# Patient Record
Sex: FEMALE | Race: BLACK OR AFRICAN AMERICAN | NOT HISPANIC OR LATINO | URBAN - METROPOLITAN AREA
[De-identification: names, ages, dates, MRNs, and addresses within clinical notes are randomized per-mention and may not be internally consistent; named-entity substitution may affect disease eponyms.]

---

## 2023-06-02 ENCOUNTER — INPATIENT (INPATIENT)
Facility: HOSPITAL | Age: 73
LOS: 3 days | Discharge: HOME HEALTH | DRG: 246 | End: 2023-06-06
Attending: HOSPITALIST | Admitting: STUDENT IN AN ORGANIZED HEALTH CARE EDUCATION/TRAINING PROGRAM
Payer: MEDICAID

## 2023-06-02 VITALS
OXYGEN SATURATION: 94 % | TEMPERATURE: 98 F | HEIGHT: 63 IN | WEIGHT: 139.99 LBS | DIASTOLIC BLOOD PRESSURE: 64 MMHG | RESPIRATION RATE: 16 BRPM | SYSTOLIC BLOOD PRESSURE: 170 MMHG

## 2023-06-02 DIAGNOSIS — I50.22 CHRONIC SYSTOLIC (CONGESTIVE) HEART FAILURE: ICD-10-CM

## 2023-06-02 DIAGNOSIS — I50.9 HEART FAILURE, UNSPECIFIED: ICD-10-CM

## 2023-06-02 DIAGNOSIS — I25.10 ATHEROSCLEROTIC HEART DISEASE OF NATIVE CORONARY ARTERY WITHOUT ANGINA PECTORIS: ICD-10-CM

## 2023-06-02 DIAGNOSIS — J45.909 UNSPECIFIED ASTHMA, UNCOMPLICATED: ICD-10-CM

## 2023-06-02 DIAGNOSIS — Z29.9 ENCOUNTER FOR PROPHYLACTIC MEASURES, UNSPECIFIED: ICD-10-CM

## 2023-06-02 LAB
ALBUMIN SERPL ELPH-MCNC: 4.2 G/DL — SIGNIFICANT CHANGE UP (ref 3.3–5)
ALP SERPL-CCNC: 91 U/L — SIGNIFICANT CHANGE UP (ref 40–120)
ALT FLD-CCNC: 29 U/L — SIGNIFICANT CHANGE UP (ref 10–45)
ANION GAP SERPL CALC-SCNC: 12 MMOL/L — SIGNIFICANT CHANGE UP (ref 5–17)
AST SERPL-CCNC: 23 U/L — SIGNIFICANT CHANGE UP (ref 10–40)
BILIRUB SERPL-MCNC: 0.4 MG/DL — SIGNIFICANT CHANGE UP (ref 0.2–1.2)
BUN SERPL-MCNC: 25 MG/DL — HIGH (ref 7–23)
CALCIUM SERPL-MCNC: 10.1 MG/DL — SIGNIFICANT CHANGE UP (ref 8.4–10.5)
CHLORIDE SERPL-SCNC: 102 MMOL/L — SIGNIFICANT CHANGE UP (ref 96–108)
CO2 SERPL-SCNC: 25 MMOL/L — SIGNIFICANT CHANGE UP (ref 22–31)
CREAT SERPL-MCNC: 0.8 MG/DL — SIGNIFICANT CHANGE UP (ref 0.5–1.3)
EGFR: 78 ML/MIN/1.73M2 — SIGNIFICANT CHANGE UP
GLUCOSE SERPL-MCNC: 159 MG/DL — HIGH (ref 70–99)
HCT VFR BLD CALC: 44.4 % — SIGNIFICANT CHANGE UP (ref 34.5–45)
HGB BLD-MCNC: 14.3 G/DL — SIGNIFICANT CHANGE UP (ref 11.5–15.5)
MCHC RBC-ENTMCNC: 29 PG — SIGNIFICANT CHANGE UP (ref 27–34)
MCHC RBC-ENTMCNC: 32.2 GM/DL — SIGNIFICANT CHANGE UP (ref 32–36)
MCV RBC AUTO: 90.1 FL — SIGNIFICANT CHANGE UP (ref 80–100)
NRBC # BLD: 0 /100 WBCS — SIGNIFICANT CHANGE UP (ref 0–0)
PLATELET # BLD AUTO: 235 K/UL — SIGNIFICANT CHANGE UP (ref 150–400)
POTASSIUM SERPL-MCNC: 4.2 MMOL/L — SIGNIFICANT CHANGE UP (ref 3.5–5.3)
POTASSIUM SERPL-SCNC: 4.2 MMOL/L — SIGNIFICANT CHANGE UP (ref 3.5–5.3)
PROT SERPL-MCNC: 7.8 G/DL — SIGNIFICANT CHANGE UP (ref 6–8.3)
RBC # BLD: 4.93 M/UL — SIGNIFICANT CHANGE UP (ref 3.8–5.2)
RBC # FLD: 16.1 % — HIGH (ref 10.3–14.5)
SODIUM SERPL-SCNC: 139 MMOL/L — SIGNIFICANT CHANGE UP (ref 135–145)
WBC # BLD: 4.87 K/UL — SIGNIFICANT CHANGE UP (ref 3.8–10.5)
WBC # FLD AUTO: 4.87 K/UL — SIGNIFICANT CHANGE UP (ref 3.8–10.5)

## 2023-06-02 PROCEDURE — 93458 L HRT ARTERY/VENTRICLE ANGIO: CPT | Mod: 26

## 2023-06-02 PROCEDURE — 99233 SBSQ HOSP IP/OBS HIGH 50: CPT

## 2023-06-02 PROCEDURE — 93010 ELECTROCARDIOGRAM REPORT: CPT

## 2023-06-02 PROCEDURE — 93306 TTE W/DOPPLER COMPLETE: CPT | Mod: 26

## 2023-06-02 PROCEDURE — 99152 MOD SED SAME PHYS/QHP 5/>YRS: CPT

## 2023-06-02 RX ORDER — HYDRALAZINE HCL 50 MG
5 TABLET ORAL
Refills: 0 | DISCHARGE

## 2023-06-02 RX ORDER — MOMETASONE FUROATE 220 UG/1
1 INHALANT RESPIRATORY (INHALATION) DAILY
Refills: 0 | Status: DISCONTINUED | OUTPATIENT
Start: 2023-06-02 | End: 2023-06-06

## 2023-06-02 RX ORDER — CARVEDILOL PHOSPHATE 80 MG/1
6.25 CAPSULE, EXTENDED RELEASE ORAL EVERY 12 HOURS
Refills: 0 | Status: DISCONTINUED | OUTPATIENT
Start: 2023-06-02 | End: 2023-06-06

## 2023-06-02 RX ORDER — ALBUTEROL 90 UG/1
2 AEROSOL, METERED ORAL EVERY 6 HOURS
Refills: 0 | Status: DISCONTINUED | OUTPATIENT
Start: 2023-06-02 | End: 2023-06-06

## 2023-06-02 RX ORDER — ATORVASTATIN CALCIUM 80 MG/1
40 TABLET, FILM COATED ORAL AT BEDTIME
Refills: 0 | Status: DISCONTINUED | OUTPATIENT
Start: 2023-06-02 | End: 2023-06-06

## 2023-06-02 RX ORDER — NIFEDIPINE 30 MG
1 TABLET, EXTENDED RELEASE 24 HR ORAL
Refills: 0 | DISCHARGE

## 2023-06-02 RX ORDER — ASPIRIN/CALCIUM CARB/MAGNESIUM 324 MG
81 TABLET ORAL DAILY
Refills: 0 | Status: DISCONTINUED | OUTPATIENT
Start: 2023-06-02 | End: 2023-06-06

## 2023-06-02 RX ORDER — MOMETASONE FUROATE 220 UG/1
1 INHALANT RESPIRATORY (INHALATION) DAILY
Refills: 0 | Status: DISCONTINUED | OUTPATIENT
Start: 2023-06-02 | End: 2023-06-02

## 2023-06-02 RX ORDER — SACUBITRIL AND VALSARTAN 24; 26 MG/1; MG/1
1 TABLET, FILM COATED ORAL
Refills: 0 | Status: DISCONTINUED | OUTPATIENT
Start: 2023-06-02 | End: 2023-06-03

## 2023-06-02 RX ADMIN — SACUBITRIL AND VALSARTAN 1 TABLET(S): 24; 26 TABLET, FILM COATED ORAL at 17:31

## 2023-06-02 RX ADMIN — ATORVASTATIN CALCIUM 40 MILLIGRAM(S): 80 TABLET, FILM COATED ORAL at 22:28

## 2023-06-02 RX ADMIN — CARVEDILOL PHOSPHATE 6.25 MILLIGRAM(S): 80 CAPSULE, EXTENDED RELEASE ORAL at 17:31

## 2023-06-02 NOTE — PROGRESS NOTE ADULT - PROBLEM SELECTOR PLAN 1
Transferred from [facility] with shortness of breath with effusions, found to have acute CHFrEF (report not in chart)   Underwent LHC 6/2 with 90% prox diagonal and 80% OM   - plans for PCI on Monday   - c/w coreg  - c/w aspirin and atorvastatin   - c/w tele

## 2023-06-02 NOTE — H&P ADULT - NSHPLABSRESULTS_GEN_ALL_CORE
Personally reviewed labs, imaging, ekg                           14.3   4.87  )-----------( 235      ( 02 Jun 2023 15:56 )             44.4             Lactate Trend      CAPILLARY BLOOD GLUCOSE  Personal interpretation EKG:

## 2023-06-02 NOTE — PROGRESS NOTE ADULT - ASSESSMENT
2 year old female h/o HTN (only med at home Dilt and Albuterol), asthma (no intubations) presents with new CHFrEF i/s/o positive stress test. [facility] with obstructive CAD, plans for PCI.  72 year old female h/o HTN (only med at home Dilt and Albuterol), asthma (no intubations) presents with new CHFrEF i/s/o positive stress test. [facility] with obstructive CAD, plans for PCI.  72 year old female h/o HTN (only med at home nifepidine and Albuterol), asthma (no intubations) presents with new CHFrEF i/s/o positive stress test. [facility] with obstructive CAD, plans for PCI.

## 2023-06-02 NOTE — H&P CARDIOLOGY - NSICDXFAMHXNEG_GEN_ALL
Clothing 10m3w Female with recent travel to [place], with viral syndrome x 2days 10m3w Female with recent travel to [place], with viral syndrome x 2days  Coxsackie virus infection pt unaware of her family hx

## 2023-06-02 NOTE — PATIENT PROFILE ADULT - FALL HARM RISK - UNIVERSAL INTERVENTIONS
Bed in lowest position, wheels locked, appropriate side rails in place/Call bell, personal items and telephone in reach/Instruct patient to call for assistance before getting out of bed or chair/Non-slip footwear when patient is out of bed/[place] to call system/Physically safe environment - no spills, clutter or unnecessary equipment/Purposeful Proactive Rounding/Room/bathroom lighting operational, light cord in reach

## 2023-06-02 NOTE — PROGRESS NOTE ADULT - SUBJECTIVE AND OBJECTIVE BOX
PATIENT: CHRISTIANE OREILLY, MRN: [id number]    CHIEF COMPLAINT: Patient is a 72y old  Female who presents with a chief complaint of     INTERVAL HISTORY/OVERNIGHT EVENTS: Patient transferred to [facility] for [facility] i/s/o acute CHFrEF.     MEDICATIONS:  MEDICATIONS  (STANDING):  aspirin enteric coated 81 milliGRAM(s) Oral daily  atorvastatin 40 milliGRAM(s) Oral at bedtime  carvedilol 6.25 milliGRAM(s) Oral every 12 hours  mometasone 220 MICROgram(s) Inhaler 1 Puff(s) Inhalation daily  sacubitril 49 mG/valsartan 51 mG 1 Tablet(s) Oral two times a day    MEDICATIONS  (PRN):  albuterol    90 MICROgram(s) HFA Inhaler 2 Puff(s) Inhalation every 6 hours PRN for shortness of breath and/or wheezing      ALLERGIES: Allergies    No Known Allergies    Intolerances        OBJECTIVE:  ICU Vital Signs Last 24 Hrs  T(C): 36.4 (2023 14:05), Max: 36.4 (2023 11:27)  T(F): 97.5 (2023 14:05), Max: 97.5 (2023 11:27)  HR: 68 (2023 16:05) (55 - 84)  BP: 140/72 (2023 16:05) (140/72 - 170/64)  BP(mean): 89 (2023 16:05) (85 - 99)  ABP: --  ABP(mean): --  RR: 17 (2023 16:05) (16 - 18)  SpO2: 94% (2023 16:05) (92% - 94%)    O2 Parameters below as of 2023 16:05  Patient On (Oxygen Delivery Method): room air            Adult Advanced Hemodynamics Last 24 Hrs  CVP(mm Hg): --  CVP(cm H2O): --  CO: --  CI: --  PA: --  PA(mean): --  PCWP: --  SVR: --  SVRI: --  PVR: --  PVRI: --  CAPILLARY BLOOD GLUCOSE        CAPILLARY BLOOD GLUCOSE        I&O's Summary    Daily Height in cm: 160.02 (2023 11:35)    Daily Weight in k.5 (2023 11:27)    PHYSICAL EXAMINATION:  General: Comfortable, no acute distress, cooperative with exam.  HEENT: PERRLA, EOMI, moist mucous membranes.  Respiratory: CTAB, normal respiratory effort, no coughing, wheezes, crackles, or rales.  CV: RRR, S1S2, no murmurs, rubs or gallops. No JVD. Distal pulses intact.  Abdominal: Soft, nontender, nondistended, no rebound or guarding, normal bowel sounds.  Neurology: AOx3, no focal neuro defects, SOTO x 4.  Extremities: No pitting edema, + Peripheral pulses.  Incisions:   Tubes:    LABS:                          14.3   4.87  )-----------( 235      ( 2023 15:56 )             44.4     06-02    139  |  102  |  25<H>  ----------------------------<  159<H>  4.2   |  25  |  0.80    Ca    10.1      2023 15:56    TPro  7.8  /  Alb  4.2  /  TBili  0.4  /  DBili  x   /  AST  23  /  ALT  29  /  AlkPhos  91  06-02    LIVER FUNCTIONS - ( 2023 15:56 )  Alb: 4.2 g/dL / Pro: 7.8 g/dL / ALK PHOS: 91 U/L / ALT: 29 U/L / AST: 23 U/L / GGT: x          PATIENT: CHRISTIANE OREILLY, MRN: [id number]    CHIEF COMPLAINT: Patient is a 72y old  Female who presents with a chief complaint of     INTERVAL HISTORY/OVERNIGHT EVENTS:  Patient transferred to [facility] for LHC i/s/o acute CHFrEF. Underwent LHC with obstructive CAD in prox diagonal and OM. Patient denies acute complaints at this time, including chest pain, shortness of breath, cough, fevers/chills. Denies weakness to the upper or lower extremities.     MEDICATIONS:  MEDICATIONS  (STANDING):  aspirin enteric coated 81 milliGRAM(s) Oral daily  atorvastatin 40 milliGRAM(s) Oral at bedtime  carvedilol 6.25 milliGRAM(s) Oral every 12 hours  mometasone 220 MICROgram(s) Inhaler 1 Puff(s) Inhalation daily  sacubitril 49 mG/valsartan 51 mG 1 Tablet(s) Oral two times a day    MEDICATIONS  (PRN):  albuterol    90 MICROgram(s) HFA Inhaler 2 Puff(s) Inhalation every 6 hours PRN for shortness of breath and/or wheezing      ALLERGIES: Allergies    No Known Allergies    Intolerances        OBJECTIVE:  ICU Vital Signs Last 24 Hrs  T(C): 36.4 (2023 14:05), Max: 36.4 (2023 11:27)  T(F): 97.5 (2023 14:05), Max: 97.5 (2023 11:27)  HR: 68 (2023 16:05) (55 - 84)  BP: 140/72 (2023 16:05) (140/72 - 170/64)  BP(mean): 89 (2023 16:05) (85 - 99)  ABP: --  ABP(mean): --  RR: 17 (2023 16:05) (16 - 18)  SpO2: 94% (2023 16:05) (92% - 94%)    O2 Parameters below as of 2023 16:05  Patient On (Oxygen Delivery Method): room air            Adult Advanced Hemodynamics Last 24 Hrs  CVP(mm Hg): --  CVP(cm H2O): --  CO: --  CI: --  PA: --  PA(mean): --  PCWP: --  SVR: --  SVRI: --  PVR: --  PVRI: --  CAPILLARY BLOOD GLUCOSE        CAPILLARY BLOOD GLUCOSE        I&O's Summary    Daily Height in cm: 160.02 (2023 11:35)    Daily Weight in k.5 (2023 11:27)    PHYSICAL EXAMINATION:  General: Comfortable, no acute distress, cooperative with exam.  HEENT: PERRLA, EOMI, moist mucous membranes.  Respiratory: CTAB, normal respiratory effort, no coughing, wheezes, crackles, or rales.  CV: RRR, S1S2, no murmurs, rubs or gallops. No JVD. Distal pulses intact.  Abdominal: Soft, nontender, nondistended, no rebound or guarding, normal bowel sounds.  Neurology: AOx3, no focal neuro defects, SOTO x 4.  Extremities: No pitting edema, + Peripheral pulses (radial and DP)     LABS:                          14.3   4.87  )-----------( 235      ( 2023 15:56 )             44.4     06-02    139  |  102  |  25<H>  ----------------------------<  159<H>  4.2   |  25  |  0.80    Ca    10.1      2023 15:56    TPro  7.8  /  Alb  4.2  /  TBili  0.4  /  DBili  x   /  AST  23  /  ALT  29  /  AlkPhos  91  06-02    LIVER FUNCTIONS - ( 2023 15:56 )  Alb: 4.2 g/dL / Pro: 7.8 g/dL / ALK PHOS: 91 U/L / ALT: 29 U/L / AST: 23 U/L / GGT: x

## 2023-06-02 NOTE — H&P CARDIOLOGY - NSICDXPASTMEDICALHX_GEN_ALL_CORE_FT
PAST MEDICAL HISTORY:  HFrEF (heart failure with reduced ejection fraction)      PAST MEDICAL HISTORY:  HFrEF (heart failure with reduced ejection fraction)     HTN (hypertension)     Mild asthma

## 2023-06-02 NOTE — H&P ADULT - NSICDXPASTMEDICALHX_GEN_ALL_CORE_FT
PAST MEDICAL HISTORY:  HFrEF (heart failure with reduced ejection fraction)     HTN (hypertension)     Mild asthma

## 2023-06-02 NOTE — PROGRESS NOTE ADULT - PROBLEM SELECTOR PLAN 2
Does not appear grossly volume overloaded at this time   - repeat TTE  - c/w entresto and coreg, well tolerated  - consider lasix if overloaded

## 2023-06-02 NOTE — H&P CARDIOLOGY - HISTORY OF PRESENT ILLNESS
72 year old female h/o    pt presented to [facility] on 5/30/23 with new onset HFrEF and had a positive stress test. Pt transferred to [facility] today for LHC.  72 year old female h/o    pt presented to [facility] on 5/30/23 with new onset HFrEF and had a positive stress test. Pt transferred to [facility] today for LHC.     wbc 4.18, H/H 12.2/38.2 Plts 225  Na 139 K 4.0 BUN 26  Cr 0.5 eGRF >60 72 year old female h/o    pt presented to [facility] on 5/30/23 with new onset HFrEF and had a positive stress test. Pt transferred to [facility] today for LHC. Will defer pre hydration IV fluids in the setting of and IV diuresis.      wbc 4.18, H/H 12.2/38.2 Plts 225  Na 139 K 4.0 BUN 26  Cr 0.5 eGRF >60 72 year old female h/o HTN, asthma (no intubations) who noted LE edema for a few weeks and then awoke at from sleep SOB and coughing early Tues AM 5/30 and   presented to [facility] on 5/30/23 with new onset HFrEF and had a positive stress test. Pt transferred to [facility] today for LHC. Will defer pre hydration IV fluids in the setting of and IV diuresis.      wbc 4.18, H/H 12.2/38.2 Plts 225  Na 139 K 4.0 BUN 26  Cr 0.5 eGRF >60 72 year old female h/o HTN (only med at home Dilt and Albuterol), asthma (no intubations) who noted LE edema for a few weeks and then awoke at from sleep SOB and coughing early Tues AM 5/30 and   presented to [facility] on 5/30/23 with new onset HFrEF and had a positive stress test. Pt transferred to [facility] today for LHC. Will defer pre hydration IV fluids in the setting of and IV diuresis.      wbc 4.18, H/H 12.2/38.2 Plts 225  Na 139 K 4.0 BUN 26  Cr 0.5 eGRF >60

## 2023-06-02 NOTE — H&P ADULT - ASSESSMENT
72 year old female h/o HTN (only med at home Dilt and Albuterol), asthma (no intubations) presents with new CHFrEF i/s/o positive stress test.

## 2023-06-03 LAB
ANION GAP SERPL CALC-SCNC: 12 MMOL/L — SIGNIFICANT CHANGE UP (ref 5–17)
BLD GP AB SCN SERPL QL: NEGATIVE — SIGNIFICANT CHANGE UP
BUN SERPL-MCNC: 35 MG/DL — HIGH (ref 7–23)
CALCIUM SERPL-MCNC: 9.9 MG/DL — SIGNIFICANT CHANGE UP (ref 8.4–10.5)
CHLORIDE SERPL-SCNC: 103 MMOL/L — SIGNIFICANT CHANGE UP (ref 96–108)
CO2 SERPL-SCNC: 20 MMOL/L — LOW (ref 22–31)
CREAT SERPL-MCNC: 0.65 MG/DL — SIGNIFICANT CHANGE UP (ref 0.5–1.3)
EGFR: 93 ML/MIN/1.73M2 — SIGNIFICANT CHANGE UP
GLUCOSE SERPL-MCNC: 124 MG/DL — HIGH (ref 70–99)
HCT VFR BLD CALC: 41.8 % — SIGNIFICANT CHANGE UP (ref 34.5–45)
HCV AB S/CO SERPL IA: 0.11 S/CO — SIGNIFICANT CHANGE UP (ref 0–0.99)
HCV AB SERPL-IMP: SIGNIFICANT CHANGE UP
HGB BLD-MCNC: 13.4 G/DL — SIGNIFICANT CHANGE UP (ref 11.5–15.5)
MAGNESIUM SERPL-MCNC: 2.1 MG/DL — SIGNIFICANT CHANGE UP (ref 1.6–2.6)
MCHC RBC-ENTMCNC: 29 PG — SIGNIFICANT CHANGE UP (ref 27–34)
MCHC RBC-ENTMCNC: 32.1 GM/DL — SIGNIFICANT CHANGE UP (ref 32–36)
MCV RBC AUTO: 90.5 FL — SIGNIFICANT CHANGE UP (ref 80–100)
NRBC # BLD: 0 /100 WBCS — SIGNIFICANT CHANGE UP (ref 0–0)
PHOSPHATE SERPL-MCNC: 3 MG/DL — SIGNIFICANT CHANGE UP (ref 2.5–4.5)
PLATELET # BLD AUTO: 208 K/UL — SIGNIFICANT CHANGE UP (ref 150–400)
POTASSIUM SERPL-MCNC: 4 MMOL/L — SIGNIFICANT CHANGE UP (ref 3.5–5.3)
POTASSIUM SERPL-SCNC: 4 MMOL/L — SIGNIFICANT CHANGE UP (ref 3.5–5.3)
RBC # BLD: 4.62 M/UL — SIGNIFICANT CHANGE UP (ref 3.8–5.2)
RBC # FLD: 16.3 % — HIGH (ref 10.3–14.5)
RH IG SCN BLD-IMP: POSITIVE — SIGNIFICANT CHANGE UP
SODIUM SERPL-SCNC: 135 MMOL/L — SIGNIFICANT CHANGE UP (ref 135–145)
TROPONIN T, HIGH SENSITIVITY RESULT: 104 NG/L — HIGH (ref 0–51)
WBC # BLD: 4.38 K/UL — SIGNIFICANT CHANGE UP (ref 3.8–10.5)
WBC # FLD AUTO: 4.38 K/UL — SIGNIFICANT CHANGE UP (ref 3.8–10.5)

## 2023-06-03 PROCEDURE — 99232 SBSQ HOSP IP/OBS MODERATE 35: CPT | Mod: GC

## 2023-06-03 PROCEDURE — 93010 ELECTROCARDIOGRAM REPORT: CPT

## 2023-06-03 PROCEDURE — 99233 SBSQ HOSP IP/OBS HIGH 50: CPT

## 2023-06-03 RX ORDER — SPIRONOLACTONE 25 MG/1
25 TABLET, FILM COATED ORAL DAILY
Refills: 0 | Status: DISCONTINUED | OUTPATIENT
Start: 2023-06-03 | End: 2023-06-05

## 2023-06-03 RX ORDER — ENOXAPARIN SODIUM 100 MG/ML
40 INJECTION SUBCUTANEOUS EVERY 24 HOURS
Refills: 0 | Status: DISCONTINUED | OUTPATIENT
Start: 2023-06-03 | End: 2023-06-06

## 2023-06-03 RX ORDER — SACUBITRIL AND VALSARTAN 24; 26 MG/1; MG/1
1 TABLET, FILM COATED ORAL
Refills: 0 | Status: DISCONTINUED | OUTPATIENT
Start: 2023-06-03 | End: 2023-06-03

## 2023-06-03 RX ORDER — SACUBITRIL AND VALSARTAN 24; 26 MG/1; MG/1
1 TABLET, FILM COATED ORAL
Refills: 0 | Status: DISCONTINUED | OUTPATIENT
Start: 2023-06-03 | End: 2023-06-06

## 2023-06-03 RX ADMIN — ATORVASTATIN CALCIUM 40 MILLIGRAM(S): 80 TABLET, FILM COATED ORAL at 22:09

## 2023-06-03 RX ADMIN — ENOXAPARIN SODIUM 40 MILLIGRAM(S): 100 INJECTION SUBCUTANEOUS at 14:46

## 2023-06-03 RX ADMIN — SACUBITRIL AND VALSARTAN 1 TABLET(S): 24; 26 TABLET, FILM COATED ORAL at 22:09

## 2023-06-03 RX ADMIN — SACUBITRIL AND VALSARTAN 1 TABLET(S): 24; 26 TABLET, FILM COATED ORAL at 06:44

## 2023-06-03 RX ADMIN — CARVEDILOL PHOSPHATE 6.25 MILLIGRAM(S): 80 CAPSULE, EXTENDED RELEASE ORAL at 18:04

## 2023-06-03 RX ADMIN — Medication 81 MILLIGRAM(S): at 14:47

## 2023-06-03 RX ADMIN — MOMETASONE FUROATE 1 PUFF(S): 220 INHALANT RESPIRATORY (INHALATION) at 18:10

## 2023-06-03 RX ADMIN — SPIRONOLACTONE 25 MILLIGRAM(S): 25 TABLET, FILM COATED ORAL at 18:10

## 2023-06-03 RX ADMIN — CARVEDILOL PHOSPHATE 6.25 MILLIGRAM(S): 80 CAPSULE, EXTENDED RELEASE ORAL at 06:44

## 2023-06-03 NOTE — PROGRESS NOTE ADULT - ASSESSMENT
72 year old female h/o HTN (only med at home nifepidine and Albuterol), asthma (no intubations) presents with new CHFrEF i/s/o positive stress test. [facility] with obstructive CAD, plans for PCI.

## 2023-06-03 NOTE — CHART NOTE - NSCHARTNOTEFT_GEN_A_CORE
Called to bedside to evaluate patient for chest pain. Patient states that about 20 minutes prior she started having head and jaw pain that radiated down to her chest (midsternum). Patient reports that her chest was not tender to palpation, and the pain was not worse with breathing or movement. She was sitting in bed and did not exert herself at the onset of the pain. Patient's family at bedside gave her a few cups of water and she reports that the chest pain is now resolved. Patient breathing comfortably, lungs clear to ascultation. Heart normal S1, S2. EKG obtained and reviewed by myself and cardiology fellow Miah Costello. No major changes identified. Will obtain repeat troponin for further evaluation and monitor patient for additional episodes of chest pain. No changes to plan according to cardiology.     Tammy Bruner MD   PGY2 Internal Medicine

## 2023-06-03 NOTE — PROGRESS NOTE ADULT - SUBJECTIVE AND OBJECTIVE BOX
Patient seen and examined at bedside.    Overnight Events: Denice CHERRY on 6/2 pm for 10 seconds up to 140s    Review Of Systems: No chest pain, shortness of breath, or palpitations            Current Meds:  albuterol    90 MICROgram(s) HFA Inhaler 2 Puff(s) Inhalation every 6 hours PRN  aspirin enteric coated 81 milliGRAM(s) Oral daily  atorvastatin 40 milliGRAM(s) Oral at bedtime  carvedilol 6.25 milliGRAM(s) Oral every 12 hours  enoxaparin Injectable 40 milliGRAM(s) SubCutaneous every 24 hours  mometasone 220 MICROgram(s) Inhaler 1 Puff(s) Inhalation daily  sacubitril 49 mG/valsartan 51 mG 1 Tablet(s) Oral two times a day      Vitals:  T(F): 97.6 (06-03), Max: 97.7 (06-02)  HR: 57 (06-03) (55 - 84)  BP: 159/82 (06-03) (139/75 - 170/64)  RR: 18 (06-03)  SpO2: 94% (06-03)  I&O's Summary    02 Jun 2023 07:01  -  03 Jun 2023 07:00  --------------------------------------------------------  IN: 440 mL / OUT: 0 mL / NET: 440 mL        Physical Exam:  Appearance: No acute distress; well appearing  Cardiovascular: RRR, S1, S2, no murmurs, rubs, or gallops; no edema; no JVD  Respiratory: Clear to auscultation bilaterally  Gastrointestinal: soft, non-tender, non-distended with normal bowel sounds  Neurologic: Non-focal                          13.4   4.38  )-----------( 208      ( 03 Jun 2023 07:12 )             41.8     06-03    135  |  103  |  35<H>  ----------------------------<  124<H>  4.0   |  20<L>  |  0.65    Ca    9.9      03 Jun 2023 07:07  Phos  3.0     06-03  Mg     2.1     06-03    TPro  7.8  /  Alb  4.2  /  TBili  0.4  /  DBili  x   /  AST  23  /  ALT  29  /  AlkPhos  91  06-02

## 2023-06-03 NOTE — PROGRESS NOTE ADULT - PROBLEM SELECTOR PLAN 1
Transferred from [facility] with shortness of breath with effusions, found to have acute CHFrEF (report not in chart)   Underwent LHC 6/2 with 90% prox diagonal and 80% OM   - plans for PCI on Monday   - c/w coreg  - c/w aspirin and atorvastatin   - c/w tele

## 2023-06-03 NOTE — PROGRESS NOTE ADULT - PROBLEM SELECTOR PLAN 2
Does not appear grossly volume overloaded at this time   TTE 6/2 with EF 38% with global hypokinesis   - c/w entresto and coreg, well tolerated  - consider lasix if overloaded Does not appear grossly volume overloaded at this time   TTE 6/2 with EF 38% with global hypokinesis (multivessel disease)   Cardiology recs appreciated  - hypertensive, will increase entresto dose   - c/w coreg, well tolerated  - started spironolactone 25mg   - consider lasix if overloaded

## 2023-06-03 NOTE — PROGRESS NOTE ADULT - ASSESSMENT
71 yo F with hx of HTN, asthma presented to [facility] with new onset ADHF and found to have +stress test, transferred to [facility] for LHC, which showed 70% D1, 80% dCx, 90% OM1, pRCA 60%, plan for PCI Monday. Currently feels well, denies CP/SOB.     #ADHF  - appears euvolemic today  - TTE showed LVEF 38% with poor wall visualization, likely global  - c/w coreg 6.25 BID  - c/w Entresto 49/51 BID, can increase to 97/103 BID if BP persistently elevated  - can start spironolactone 25 daily  - eventual SGLT2i  - can hold diuretics for now    #CAD  - c/w asa and statin as written  73 yo F with hx of HTN, asthma presented to [facility] with new onset ADHF and found to have +stress test, transferred to [facility] for LHC, which showed 70% D1, 80% dCx, 90% OM1, pRCA 60%, plan for PCI Monday. Currently feels well, denies CP/SOB.     #ADHF  - appears euvolemic today  - TTE showed LVEF 38% with poor wall visualization, likely global  - c/w coreg 6.25 BID  - c/w Entresto 49/51 BID, can increase to 97/103 BID if BP persistently elevated  - can start spironolactone 25 daily  - eventual SGLT2i  - can hold diuretics for now    #CAD  - c/w asa and statin as written     This patient was seen and examined personally by me and the plan was discussed with the fellow and/or resident above. Amendments were made as necessary to the above. Agree with the excellent note and plan above. Revasc plan for monday, likely PCI.    Pedro Ramírez MD, MPhil, [facility]  Cardiologist, [facility]  ; [facility] and hospitals/[facility]  Email: [email or web address] Cardiology and Cardiovascular Surgery on-service contact/call information, go to [email or web address] and use "[email or web address] Identity [email or web address]" to login.  Outpatient Cardiology appointments, call [phone] to arrange with a colleague; I do not have outpatient Cardiology clinic.

## 2023-06-03 NOTE — PROGRESS NOTE ADULT - SUBJECTIVE AND OBJECTIVE BOX
PATIENT: CHRISTIANE OREILLY, MRN: [id number]    CHIEF COMPLAINT: Patient is a 72y old  Female who presents with a chief complaint of     INTERVAL HISTORY/OVERNIGHT EVENTS: No overnight events. Patient denies chest pain, SOB, lightheadedness this AM     MEDICATIONS:  MEDICATIONS  (STANDING):  aspirin enteric coated 81 milliGRAM(s) Oral daily  atorvastatin 40 milliGRAM(s) Oral at bedtime  carvedilol 6.25 milliGRAM(s) Oral every 12 hours  mometasone 220 MICROgram(s) Inhaler 1 Puff(s) Inhalation daily  sacubitril 49 mG/valsartan 51 mG 1 Tablet(s) Oral two times a day    MEDICATIONS  (PRN):  albuterol    90 MICROgram(s) HFA Inhaler 2 Puff(s) Inhalation every 6 hours PRN for shortness of breath and/or wheezing      ALLERGIES: Allergies    No Known Allergies    Intolerances        OBJECTIVE:  ICU Vital Signs Last 24 Hrs  T(C): 36.4 (2023 04:00), Max: 36.5 (2023 20:46)  T(F): 97.6 (2023 04:00), Max: 97.7 (2023 20:46)  HR: 57 (2023 04:00) (55 - 84)  BP: 159/82 (2023 04:00) (139/75 - 170/64)  BP(mean): 91 (2023 17:35) (85 - 101)  ABP: --  ABP(mean): --  RR: 18 (2023 04:00) (16 - 18)  SpO2: 94% (2023 04:00) (92% - 96%)    O2 Parameters below as of 2023 04:00  Patient On (Oxygen Delivery Method): room air            Adult Advanced Hemodynamics Last 24 Hrs  CVP(mm Hg): --  CVP(cm H2O): --  CO: --  CI: --  PA: --  PA(mean): --  PCWP: --  SVR: --  SVRI: --  PVR: --  PVRI: --  CAPILLARY BLOOD GLUCOSE        CAPILLARY BLOOD GLUCOSE        I&O's Summary    2023 07:01  -  2023 07:00  --------------------------------------------------------  IN: 440 mL / OUT: 0 mL / NET: 440 mL      Daily Height in cm: 160.02 (2023 11:35)    Daily Weight in k.9 (2023 04:00)    PHYSICAL EXAMINATION:  General: Comfortable, no acute distress, cooperative with exam.  HEENT: PERRLA, EOMI, moist mucous membranes.  Respiratory: CTAB, normal respiratory effort, no coughing, wheezes, crackles, or rales.  CV: RRR, S1S2, no murmurs, rubs or gallops. No JVD. Distal pulses intact.  Abdominal: Soft, nontender, nondistended, no rebound or guarding, normal bowel sounds.  Neurology: AOx3, no focal neuro defects, SOTO x 4.  Extremities: No pitting edema, + Peripheral pulses (radial and DP)       LABS:                          14.3   4.87  )-----------( 235      ( 2023 15:56 )             44.4     06-02    139  |  102  |  25<H>  ----------------------------<  159<H>  4.2   |  25  |  0.80    Ca    10.1      2023 15:56    TPro  7.8  /  Alb  4.2  /  TBili  0.4  /  DBili  x   /  AST  23  /  ALT  29  /  AlkPhos  91  06-02    LIVER FUNCTIONS - ( 2023 15:56 )  Alb: 4.2 g/dL / Pro: 7.8 g/dL / ALK PHOS: 91 U/L / ALT: 29 U/L / AST: 23 U/L / GGT: x                  PATIENT: CHRISTIANE OREILLY, MRN: [id number]    CHIEF COMPLAINT: Patient is a 72y old  Female who presents with a chief complaint of     INTERVAL HISTORY/OVERNIGHT EVENTS: Had PAT for 10sec overnight. Patient denies chest pain, SOB, lightheadedness this AM. No acute complaints.     MEDICATIONS:  MEDICATIONS  (STANDING):  aspirin enteric coated 81 milliGRAM(s) Oral daily  atorvastatin 40 milliGRAM(s) Oral at bedtime  carvedilol 6.25 milliGRAM(s) Oral every 12 hours  mometasone 220 MICROgram(s) Inhaler 1 Puff(s) Inhalation daily  sacubitril 49 mG/valsartan 51 mG 1 Tablet(s) Oral two times a day    MEDICATIONS  (PRN):  albuterol    90 MICROgram(s) HFA Inhaler 2 Puff(s) Inhalation every 6 hours PRN for shortness of breath and/or wheezing      ALLERGIES: Allergies    No Known Allergies    Intolerances        OBJECTIVE:  ICU Vital Signs Last 24 Hrs  T(C): 36.4 (2023 04:00), Max: 36.5 (2023 20:46)  T(F): 97.6 (2023 04:00), Max: 97.7 (2023 20:46)  HR: 57 (2023 04:00) (55 - 84)  BP: 159/82 (2023 04:00) (139/75 - 170/64)  BP(mean): 91 (2023 17:35) (85 - 101)  ABP: --  ABP(mean): --  RR: 18 (2023 04:00) (16 - 18)  SpO2: 94% (2023 04:00) (92% - 96%)    O2 Parameters below as of 2023 04:00  Patient On (Oxygen Delivery Method): room air            Adult Advanced Hemodynamics Last 24 Hrs  CVP(mm Hg): --  CVP(cm H2O): --  CO: --  CI: --  PA: --  PA(mean): --  PCWP: --  SVR: --  SVRI: --  PVR: --  PVRI: --  CAPILLARY BLOOD GLUCOSE        CAPILLARY BLOOD GLUCOSE        I&O's Summary    2023 07:01  -  2023 07:00  --------------------------------------------------------  IN: 440 mL / OUT: 0 mL / NET: 440 mL      Daily Height in cm: 160.02 (2023 11:35)    Daily Weight in k.9 (2023 04:00)    PHYSICAL EXAMINATION:  General: Comfortable, no acute distress, cooperative with exam.  HEENT: PERRLA, EOMI, moist mucous membranes.  Respiratory: CTAB, normal respiratory effort, no coughing, wheezes, crackles, or rales.  CV: RRR, S1S2, no murmurs, rubs or gallops. No JVD. Distal pulses intact.  Abdominal: Soft, nontender, nondistended, no rebound or guarding, normal bowel sounds.  Neurology: AOx3, no focal neuro defects, SOTO x 4.  Extremities: No pitting edema, + Peripheral pulses (radial and DP)       LABS:                          14.3   4.87  )-----------( 235      ( 2023 15:56 )             44.4     06-02    139  |  102  |  25<H>  ----------------------------<  159<H>  4.2   |  25  |  0.80    Ca    10.1      2023 15:56    TPro  7.8  /  Alb  4.2  /  TBili  0.4  /  DBili  x   /  AST  23  /  ALT  29  /  AlkPhos  91  06-02    LIVER FUNCTIONS - ( 2023 15:56 )  Alb: 4.2 g/dL / Pro: 7.8 g/dL / ALK PHOS: 91 U/L / ALT: 29 U/L / AST: 23 U/L / GGT: x

## 2023-06-04 LAB
ANION GAP SERPL CALC-SCNC: 11 MMOL/L — SIGNIFICANT CHANGE UP (ref 5–17)
APTT BLD: 30.5 SEC — SIGNIFICANT CHANGE UP (ref 27.5–35.5)
BUN SERPL-MCNC: 38 MG/DL — HIGH (ref 7–23)
CALCIUM SERPL-MCNC: 9.6 MG/DL — SIGNIFICANT CHANGE UP (ref 8.4–10.5)
CHLORIDE SERPL-SCNC: 104 MMOL/L — SIGNIFICANT CHANGE UP (ref 96–108)
CO2 SERPL-SCNC: 21 MMOL/L — LOW (ref 22–31)
CREAT SERPL-MCNC: 0.72 MG/DL — SIGNIFICANT CHANGE UP (ref 0.5–1.3)
EGFR: 89 ML/MIN/1.73M2 — SIGNIFICANT CHANGE UP
GLUCOSE SERPL-MCNC: 132 MG/DL — HIGH (ref 70–99)
HCT VFR BLD CALC: 39.6 % — SIGNIFICANT CHANGE UP (ref 34.5–45)
HGB BLD-MCNC: 12.7 G/DL — SIGNIFICANT CHANGE UP (ref 11.5–15.5)
INR BLD: 1.04 RATIO — SIGNIFICANT CHANGE UP (ref 0.88–1.16)
MAGNESIUM SERPL-MCNC: 2.2 MG/DL — SIGNIFICANT CHANGE UP (ref 1.6–2.6)
MCHC RBC-ENTMCNC: 29.5 PG — SIGNIFICANT CHANGE UP (ref 27–34)
MCHC RBC-ENTMCNC: 32.1 GM/DL — SIGNIFICANT CHANGE UP (ref 32–36)
MCV RBC AUTO: 91.9 FL — SIGNIFICANT CHANGE UP (ref 80–100)
NRBC # BLD: 0 /100 WBCS — SIGNIFICANT CHANGE UP (ref 0–0)
PHOSPHATE SERPL-MCNC: 3.5 MG/DL — SIGNIFICANT CHANGE UP (ref 2.5–4.5)
PLATELET # BLD AUTO: 211 K/UL — SIGNIFICANT CHANGE UP (ref 150–400)
POTASSIUM SERPL-MCNC: 5.5 MMOL/L — HIGH (ref 3.5–5.3)
POTASSIUM SERPL-SCNC: 5.5 MMOL/L — HIGH (ref 3.5–5.3)
PROTHROM AB SERPL-ACNC: 12 SEC — SIGNIFICANT CHANGE UP (ref 10.5–13.4)
RBC # BLD: 4.31 M/UL — SIGNIFICANT CHANGE UP (ref 3.8–5.2)
RBC # FLD: 16.4 % — HIGH (ref 10.3–14.5)
SODIUM SERPL-SCNC: 136 MMOL/L — SIGNIFICANT CHANGE UP (ref 135–145)
TROPONIN T, HIGH SENSITIVITY RESULT: 91 NG/L — HIGH (ref 0–51)
WBC # BLD: 4.81 K/UL — SIGNIFICANT CHANGE UP (ref 3.8–10.5)
WBC # FLD AUTO: 4.81 K/UL — SIGNIFICANT CHANGE UP (ref 3.8–10.5)

## 2023-06-04 PROCEDURE — 99232 SBSQ HOSP IP/OBS MODERATE 35: CPT

## 2023-06-04 PROCEDURE — 99233 SBSQ HOSP IP/OBS HIGH 50: CPT

## 2023-06-04 RX ORDER — ACETAMINOPHEN 500 MG
650 TABLET ORAL EVERY 6 HOURS
Refills: 0 | Status: DISCONTINUED | OUTPATIENT
Start: 2023-06-04 | End: 2023-06-06

## 2023-06-04 RX ADMIN — SACUBITRIL AND VALSARTAN 1 TABLET(S): 24; 26 TABLET, FILM COATED ORAL at 21:31

## 2023-06-04 RX ADMIN — MOMETASONE FUROATE 1 PUFF(S): 220 INHALANT RESPIRATORY (INHALATION) at 13:11

## 2023-06-04 RX ADMIN — Medication 650 MILLIGRAM(S): at 22:30

## 2023-06-04 RX ADMIN — SACUBITRIL AND VALSARTAN 1 TABLET(S): 24; 26 TABLET, FILM COATED ORAL at 09:18

## 2023-06-04 RX ADMIN — ENOXAPARIN SODIUM 40 MILLIGRAM(S): 100 INJECTION SUBCUTANEOUS at 14:49

## 2023-06-04 RX ADMIN — Medication 81 MILLIGRAM(S): at 13:12

## 2023-06-04 RX ADMIN — CARVEDILOL PHOSPHATE 6.25 MILLIGRAM(S): 80 CAPSULE, EXTENDED RELEASE ORAL at 17:45

## 2023-06-04 RX ADMIN — CARVEDILOL PHOSPHATE 6.25 MILLIGRAM(S): 80 CAPSULE, EXTENDED RELEASE ORAL at 05:05

## 2023-06-04 RX ADMIN — SPIRONOLACTONE 25 MILLIGRAM(S): 25 TABLET, FILM COATED ORAL at 05:05

## 2023-06-04 RX ADMIN — ATORVASTATIN CALCIUM 40 MILLIGRAM(S): 80 TABLET, FILM COATED ORAL at 21:32

## 2023-06-04 NOTE — PROGRESS NOTE ADULT - ASSESSMENT
71 yo F with hx of HTN, asthma presented to [facility] with new onset ADHF and found to have +stress test, transferred to [facility] for LHC, which showed 70% D1, 80% dCx, 90% OM1, pRCA 60%, plan for PCI Monday.     #ADHF  - appears euvolemic today  - TTE showed LVEF 38% with poor wall visualization, likely global  - c/w coreg 6.25 BID  - c/w Entresto 97/103 BID   - spironolactone 25 daily  - eventual SGLT2i  - can hold diuretics for now    #CAD  - c/w asa and statin as written  73 yo F with hx of HTN, asthma presented to [facility] with new onset ADHF and found to have +stress test, transferred to [facility] for LHC, which showed 70% D1, 80% dCx, 90% OM1, pRCA 60%, plan for PCI Monday.     #ADHF  - appears euvolemic today  - TTE showed LVEF 38% with poor wall visualization, likely global  - c/w coreg 6.25 BID  - c/w Entresto 97/103 BID   - spironolactone 25 daily  - eventual SGLT2i  - can hold diuretics for now    #CAD  - c/w asa and statin as written     This patient was seen and examined personally by me and the plan was discussed with the fellow and/or resident above. Amendments were made as necessary to the above. Agree with the excellent note and plan above. PCI tmw.    Pedro Ramírez MD, MPhil, [facility]  Cardiologist, [facility]  ; [facility] and hospitals/[facility]  Email: [email or web address] Cardiology and Cardiovascular Surgery on-service contact/call information, go to [email or web address] and use "[email or web address]" to login.  Outpatient Cardiology appointments, call [phone] to arrange with a colleague; I do not have outpatient Cardiology clinic.

## 2023-06-04 NOTE — PROGRESS NOTE ADULT - ASSESSMENT
72 year old female h/o HTN (only med at home nifepidine and Albuterol), asthma (no intubations) presents with new CHFrEF i/s/o positive stress test. [facility] with obstructive CAD, plans for PCI.

## 2023-06-04 NOTE — PROGRESS NOTE ADULT - PROBLEM SELECTOR PLAN 2
Does not appear grossly volume overloaded at this time   TTE 6/2 with EF 38% with global hypokinesis (multivessel disease)   Cardiology recs appreciated  - hypertensive, will increase entresto dose   - c/w coreg, well tolerated  - continue with spironolactone 25mg   - consider lasix if overloaded

## 2023-06-04 NOTE — PROGRESS NOTE ADULT - SUBJECTIVE AND OBJECTIVE BOX
Patient seen and examined at bedside.    Overnight Events:   Denies any chest pain, SOB, numbness, weakness     Current Meds:  albuterol    90 MICROgram(s) HFA Inhaler 2 Puff(s) Inhalation every 6 hours PRN  aspirin enteric coated 81 milliGRAM(s) Oral daily  atorvastatin 40 milliGRAM(s) Oral at bedtime  carvedilol 6.25 milliGRAM(s) Oral every 12 hours  enoxaparin Injectable 40 milliGRAM(s) SubCutaneous every 24 hours  mometasone 220 MICROgram(s) Inhaler 1 Puff(s) Inhalation daily  sacubitril 97 mG/valsartan 103 mG 1 Tablet(s) Oral two times a day  spironolactone 25 milliGRAM(s) Oral daily      Vitals:  T(F): 97.8 (06-04), Max: 98.5 (06-03)  HR: 75 (06-04) (65 - 75)  BP: 151/74 (06-04) (151/74 - 181/85)  RR: 18 (06-03)  SpO2: 96% (06-03)  I&O's Summary    03 Jun 2023 07:01  -  04 Jun 2023 07:00  --------------------------------------------------------  IN: 180 mL / OUT: 0 mL / NET: 180 mL        Physical Exam:  Appearance: No acute distress; well appearing  Cardiovascular: RRR, S1, S2, no murmurs, rubs, or gallops; no edema; no JVD  Respiratory: Clear to auscultation bilaterally  Musculoskeletal: +2 R radial pulse, sensation and strength intact of R hand   Neurologic: Non-focal  Psychiatry: AAOx3, mood & affect appropriate  Skin: No rashes, ecchymoses, or cyanosis                          12.7   4.81  )-----------( 211      ( 04 Jun 2023 07:20 )             39.6     06-04    136  |  104  |  38<H>  ----------------------------<  132<H>  5.5<H>   |  21<L>  |  0.72    Ca    9.6      04 Jun 2023 07:17  Phos  3.5     06-04  Mg     2.2     06-04    TPro  7.8  /  Alb  4.2  /  TBili  0.4  /  DBili  x   /  AST  23  /  ALT  29  /  AlkPhos  91  06-02    PT/INR - ( 04 Jun 2023 07:20 )   PT: 12.0 sec;   INR: 1.04 ratio         PTT - ( 04 Jun 2023 07:20 )  PTT:30.5 sec  CARDIAC MARKERS ( 04 Jun 2023 01:03 )  91 ng/L / x     / x     / x     / x     / x      CARDIAC MARKERS ( 03 Jun 2023 17:39 )  104 ng/L / x     / x     / x     / x     / x                  New ECG(s):     Echo:    Stress Testing:     Cath:    Imaging:    Interpretation of Telemetry:

## 2023-06-04 NOTE — PROGRESS NOTE ADULT - PROBLEM SELECTOR PLAN 1
Transferred from [facility] with shortness of breath with effusions, found to have acute CHFrEF (report not in chart)   Underwent LHC 6/2 with 90% prox diagonal and 80% OM   - plans for PCI on Monday   - c/w coreg  - c/w aspirin and atorvastatin   - c/w tele

## 2023-06-04 NOTE — PROGRESS NOTE ADULT - SUBJECTIVE AND OBJECTIVE BOX
Patient is a 72y old  Female who presents with a chief complaint of    INTERVAL HPI/OVERNIGHT EVENTS:  - No acute events overnight    SUBJECTIVE  - Patient seen and evaluated at bedside  - Patient reports presence of   - Patient reports absence of fevers, chills, HA, lightheadedness, dizziness, nausea, emesis, chest pain, dyspnea, palpitations, abd pain, diarrhea, urinary symptoms, skin color changes or rashes, or LE edema     MEDICATIONS  (STANDING):  aspirin enteric coated 81 milliGRAM(s) Oral daily  atorvastatin 40 milliGRAM(s) Oral at bedtime  carvedilol 6.25 milliGRAM(s) Oral every 12 hours  enoxaparin Injectable 40 milliGRAM(s) SubCutaneous every 24 hours  mometasone 220 MICROgram(s) Inhaler 1 Puff(s) Inhalation daily  sacubitril 97 mG/valsartan 103 mG 1 Tablet(s) Oral two times a day  spironolactone 25 milliGRAM(s) Oral daily    MEDICATIONS  (PRN):  albuterol    90 MICROgram(s) HFA Inhaler 2 Puff(s) Inhalation every 6 hours PRN for shortness of breath and/or wheezing        REVIEW OF SYSTEMS: As indicated above; otherwise, negative    VITAL SIGNS:  T(F): 97.8 (06-04-23 @ 04:45), Max: 98.5 (06-03-23 @ 16:35)  HR: 75 (06-04-23 @ 04:45) (65 - 75)  BP: 151/74 (06-04-23 @ 04:45) (151/74 - 181/85)  RR: 18 (06-03-23 @ 21:01) (18 - 18)  SpO2: 96% (06-03-23 @ 21:01) (95% - 96%)    PHYSICAL EXAM:  General: NAD, well-groomed, well-developed  Eyes: Conjunctiva and sclera clear  ENMT: Moist mucous membranes  Neck: No palpable pre-auricular, post-auricular, occipital, mandibular, submental, supra-clavicular, or infra-clavicular lymph nodes   Chest: Clear to auscultation bilaterally; no rales, rhonchi, or wheezing  Heart: Regular rate and rhythm; normal S1 and S2; no murmurs, rubs, or gallops  Abd: Soft, nontender, nondistended  Nervous System: AAOX3  Psych: Appropriate affect  Ext: no peripheral LE edema bilaterally    LABS:      Ca    9.9        03 Jun 2023 07:07      CAPILLARY BLOOD GLUCOSE        BLOOD CULTURE    RADIOLOGY & ADDITIONAL TESTS:    Imaging Personally Reviewed:  [X ] YES     Consultant(s) Notes Reviewed:  Yes    Care Discussed with Consultants/Other Providers: Yes Patient is a 72y old  Female who presents with a chief complaint of    INTERVAL HPI/OVERNIGHT EVENTS:  - No acute events overnight    SUBJECTIVE  - Patient seen and evaluated at bedside  - Patient reports presence of   - Patient reports absence of fevers, chills, HA, lightheadedness, dizziness, nausea, emesis, chest pain, dyspnea, palpitations, abd pain, diarrhea, urinary symptoms, skin color changes or rashes, or LE edema     MEDICATIONS  (STANDING):  aspirin enteric coated 81 milliGRAM(s) Oral daily  atorvastatin 40 milliGRAM(s) Oral at bedtime  carvedilol 6.25 milliGRAM(s) Oral every 12 hours  enoxaparin Injectable 40 milliGRAM(s) SubCutaneous every 24 hours  mometasone 220 MICROgram(s) Inhaler 1 Puff(s) Inhalation daily  sacubitril 97 mG/valsartan 103 mG 1 Tablet(s) Oral two times a day  spironolactone 25 milliGRAM(s) Oral daily    MEDICATIONS  (PRN):  albuterol    90 MICROgram(s) HFA Inhaler 2 Puff(s) Inhalation every 6 hours PRN for shortness of breath and/or wheezing        REVIEW OF SYSTEMS: As indicated above; otherwise, negative    VITAL SIGNS:  T(F): 97.8 (06-04-23 @ 04:45), Max: 98.5 (06-03-23 @ 16:35)  HR: 75 (06-04-23 @ 04:45) (65 - 75)  BP: 151/74 (06-04-23 @ 04:45) (151/74 - 181/85)  RR: 18 (06-03-23 @ 21:01) (18 - 18)  SpO2: 96% (06-03-23 @ 21:01) (95% - 96%)    PHYSICAL EXAM:  General: NAD, well-groomed, well-developed  Eyes: Conjunctiva and sclera clear  ENMT: Moist mucous membranes  Neck: No palpable pre-auricular, post-auricular, occipital, mandibular, submental, supra-clavicular, or infra-clavicular lymph nodes   Chest: Clear to auscultation bilaterally; no rales, rhonchi, or wheezing  Heart: Regular rate and rhythm; normal S1 and S2; no murmurs, rubs, or gallops  Abd: Soft, nontender, nondistended  Nervous System: AAOX3  Psych: Appropriate affect  Ext: no peripheral LE edema bilaterally    LABS:                        12.7   4.81  )-----------( 211      ( 04 Jun 2023 07:20 )             39.6     04 Jun 2023 07:17    136    |  104    |  38     ----------------------------<  132    5.5     |  21     |  0.72     Ca    9.6        04 Jun 2023 07:17  Phos  3.5       04 Jun 2023 07:17  Mg     2.2       04 Jun 2023 07:17    PT/INR - ( 04 Jun 2023 07:20 )   PT: 12.0 sec;   INR: 1.04 ratio         PTT - ( 04 Jun 2023 07:20 )  PTT:30.5 sec  CAPILLARY BLOOD GLUCOSE        BLOOD CULTURE    RADIOLOGY & ADDITIONAL TESTS:    Imaging Personally Reviewed:  [X ] YES     Consultant(s) Notes Reviewed:  Yes    Care Discussed with Consultants/Other Providers: Yes Patient is a 72y old  Female who presents with a chief complaint of    INTERVAL HPI/OVERNIGHT EVENTS:  - No acute events overnight    SUBJECTIVE  - Patient seen and evaluated at bedside  - Patient reports absence of fevers, lightheadedness, dizziness, nausea, emesis, chest pain, dyspnea, palpitations, abd pain, diarrhea, urinary symptoms, or LE edema     MEDICATIONS  (STANDING):  aspirin enteric coated 81 milliGRAM(s) Oral daily  atorvastatin 40 milliGRAM(s) Oral at bedtime  carvedilol 6.25 milliGRAM(s) Oral every 12 hours  enoxaparin Injectable 40 milliGRAM(s) SubCutaneous every 24 hours  mometasone 220 MICROgram(s) Inhaler 1 Puff(s) Inhalation daily  sacubitril 97 mG/valsartan 103 mG 1 Tablet(s) Oral two times a day  spironolactone 25 milliGRAM(s) Oral daily    MEDICATIONS  (PRN):  albuterol    90 MICROgram(s) HFA Inhaler 2 Puff(s) Inhalation every 6 hours PRN for shortness of breath and/or wheezing        REVIEW OF SYSTEMS: As indicated above; otherwise, negative    VITAL SIGNS:  T(F): 97.8 (06-04-23 @ 04:45), Max: 98.5 (06-03-23 @ 16:35)  HR: 75 (06-04-23 @ 04:45) (65 - 75)  BP: 151/74 (06-04-23 @ 04:45) (151/74 - 181/85)  RR: 18 (06-03-23 @ 21:01) (18 - 18)  SpO2: 96% (06-03-23 @ 21:01) (95% - 96%)    PHYSICAL EXAM:  General: NAD  Eyes: Sclera clear  Neck: No palpable pre-auricular, post-auricular, occipital, mandibular, submental, supra-clavicular, or infra-clavicular lymph nodes   Chest: Clear to auscultation bilaterally; no rales, rhonchi, or wheezing appreciated  Heart: Regular rate and rhythm; intact S1 and S2; no obvious murmurs, rubs, or gallops  Abd: Soft, nontender to palpation, nondistended  Nervous System: Alert and oriented  Psych: Appropriate affect  Ext: no peripheral LE edema bilaterally    LABS:                        12.7   4.81  )-----------( 211 ( 04 Jun 2023 07:20 )             39.6     04 Jun 2023 07:17    136    |  104    |  38     ----------------------------<  132    5.5     |  21     |  0.72     Ca    9.6        04 Jun 2023 07:17  Phos  3.5       04 Jun 2023 07:17  Mg     2.2       04 Jun 2023 07:17    PT/INR - ( 04 Jun 2023 07:20 )   PT: 12.0 sec;   INR: 1.04 ratio         PTT - ( 04 Jun 2023 07:20 )  PTT:30.5 sec  CAPILLARY BLOOD GLUCOSE        BLOOD CULTURE    RADIOLOGY & ADDITIONAL TESTS:    Imaging Personally Reviewed:  [X ] YES     Consultant(s) Notes Reviewed:  Yes    Care Discussed with Consultants/Other Providers: Yes

## 2023-06-05 DIAGNOSIS — I50.21 ACUTE SYSTOLIC (CONGESTIVE) HEART FAILURE: ICD-10-CM

## 2023-06-05 LAB
ANION GAP SERPL CALC-SCNC: 11 MMOL/L — SIGNIFICANT CHANGE UP (ref 5–17)
ANION GAP SERPL CALC-SCNC: 14 MMOL/L — SIGNIFICANT CHANGE UP (ref 5–17)
BUN SERPL-MCNC: 32 MG/DL — HIGH (ref 7–23)
BUN SERPL-MCNC: 37 MG/DL — HIGH (ref 7–23)
CALCIUM SERPL-MCNC: 9.5 MG/DL — SIGNIFICANT CHANGE UP (ref 8.4–10.5)
CALCIUM SERPL-MCNC: 9.9 MG/DL — SIGNIFICANT CHANGE UP (ref 8.4–10.5)
CHLORIDE SERPL-SCNC: 103 MMOL/L — SIGNIFICANT CHANGE UP (ref 96–108)
CHLORIDE SERPL-SCNC: 104 MMOL/L — SIGNIFICANT CHANGE UP (ref 96–108)
CO2 SERPL-SCNC: 18 MMOL/L — LOW (ref 22–31)
CO2 SERPL-SCNC: 22 MMOL/L — SIGNIFICANT CHANGE UP (ref 22–31)
CREAT SERPL-MCNC: 0.65 MG/DL — SIGNIFICANT CHANGE UP (ref 0.5–1.3)
CREAT SERPL-MCNC: 0.77 MG/DL — SIGNIFICANT CHANGE UP (ref 0.5–1.3)
EGFR: 82 ML/MIN/1.73M2 — SIGNIFICANT CHANGE UP
EGFR: 93 ML/MIN/1.73M2 — SIGNIFICANT CHANGE UP
GLUCOSE SERPL-MCNC: 111 MG/DL — HIGH (ref 70–99)
GLUCOSE SERPL-MCNC: 124 MG/DL — HIGH (ref 70–99)
MAGNESIUM SERPL-MCNC: 2.2 MG/DL — SIGNIFICANT CHANGE UP (ref 1.6–2.6)
PHOSPHATE SERPL-MCNC: 2.8 MG/DL — SIGNIFICANT CHANGE UP (ref 2.5–4.5)
POTASSIUM SERPL-MCNC: 4.7 MMOL/L — SIGNIFICANT CHANGE UP (ref 3.5–5.3)
POTASSIUM SERPL-MCNC: 5.4 MMOL/L — HIGH (ref 3.5–5.3)
POTASSIUM SERPL-SCNC: 4.7 MMOL/L — SIGNIFICANT CHANGE UP (ref 3.5–5.3)
POTASSIUM SERPL-SCNC: 5.4 MMOL/L — HIGH (ref 3.5–5.3)
SODIUM SERPL-SCNC: 136 MMOL/L — SIGNIFICANT CHANGE UP (ref 135–145)
SODIUM SERPL-SCNC: 136 MMOL/L — SIGNIFICANT CHANGE UP (ref 135–145)

## 2023-06-05 PROCEDURE — 99233 SBSQ HOSP IP/OBS HIGH 50: CPT | Mod: GC

## 2023-06-05 PROCEDURE — 99232 SBSQ HOSP IP/OBS MODERATE 35: CPT | Mod: GC

## 2023-06-05 PROCEDURE — 92929: CPT | Mod: LC

## 2023-06-05 PROCEDURE — 99152 MOD SED SAME PHYS/QHP 5/>YRS: CPT

## 2023-06-05 PROCEDURE — 92928 PRQ TCAT PLMT NTRAC ST 1 LES: CPT | Mod: LC

## 2023-06-05 PROCEDURE — 93010 ELECTROCARDIOGRAM REPORT: CPT

## 2023-06-05 RX ORDER — CLOPIDOGREL BISULFATE 75 MG/1
600 TABLET, FILM COATED ORAL ONCE
Refills: 0 | Status: COMPLETED | OUTPATIENT
Start: 2023-06-05 | End: 2023-06-05

## 2023-06-05 RX ORDER — CLOPIDOGREL BISULFATE 75 MG/1
75 TABLET, FILM COATED ORAL DAILY
Refills: 0 | Status: DISCONTINUED | OUTPATIENT
Start: 2023-06-05 | End: 2023-06-06

## 2023-06-05 RX ORDER — SPIRONOLACTONE 25 MG/1
12.5 TABLET, FILM COATED ORAL DAILY
Refills: 0 | Status: DISCONTINUED | OUTPATIENT
Start: 2023-06-06 | End: 2023-06-06

## 2023-06-05 RX ADMIN — SACUBITRIL AND VALSARTAN 1 TABLET(S): 24; 26 TABLET, FILM COATED ORAL at 21:10

## 2023-06-05 RX ADMIN — CARVEDILOL PHOSPHATE 6.25 MILLIGRAM(S): 80 CAPSULE, EXTENDED RELEASE ORAL at 18:28

## 2023-06-05 RX ADMIN — Medication 100 MILLIGRAM(S): at 00:25

## 2023-06-05 RX ADMIN — SPIRONOLACTONE 25 MILLIGRAM(S): 25 TABLET, FILM COATED ORAL at 05:28

## 2023-06-05 RX ADMIN — CARVEDILOL PHOSPHATE 6.25 MILLIGRAM(S): 80 CAPSULE, EXTENDED RELEASE ORAL at 05:29

## 2023-06-05 RX ADMIN — Medication 100 MILLIGRAM(S): at 21:09

## 2023-06-05 RX ADMIN — ATORVASTATIN CALCIUM 40 MILLIGRAM(S): 80 TABLET, FILM COATED ORAL at 21:09

## 2023-06-05 RX ADMIN — CLOPIDOGREL BISULFATE 600 MILLIGRAM(S): 75 TABLET, FILM COATED ORAL at 12:07

## 2023-06-05 RX ADMIN — SACUBITRIL AND VALSARTAN 1 TABLET(S): 24; 26 TABLET, FILM COATED ORAL at 09:58

## 2023-06-05 RX ADMIN — Medication 81 MILLIGRAM(S): at 09:58

## 2023-06-05 NOTE — PROGRESS NOTE ADULT - PROBLEM SELECTOR PLAN 2
Does not appear grossly volume overloaded at this time   TTE 6/2 with EF 38% with global hypokinesis (multivessel disease)   Cardiology recs appreciated  - hypertensive, will increase entresto dose   - c/w coreg, well tolerated  - continue with spironolactone 25mg   - consider lasix if overloaded Does not appear grossly volume overloaded at this time   TTE 6/2 with EF 38% with global hypokinesis (multivessel disease)   Cardiology recs appreciated  - continue entresto  - c/w coreg, well tolerated  - continue with spironolactone - will decrease dose to 12.5 mg PO daily in setting of modest hyperkalemia  - consider lasix if overloaded

## 2023-06-05 NOTE — PROGRESS NOTE ADULT - ASSESSMENT
71 yo F with hx of HTN, asthma presented to [facility] with new onset ADHF and found to have +stress test, transferred to [facility] for LHC, which showed 70% D1, 80% dCx, 90% OM1, pRCA 60%, plan for PCI Monday.     #ADHF  - appears euvolemic today  - TTE showed LVEF 38% with poor wall visualization, likely global  - c/w coreg 6.25 BID  - c/w Entresto 97/103 BID   - spironolactone 25 daily  - eventual SGLT2i outpatient  - can hold diuretics for now    #CAD  - c/w asa and statin as written   - hstrop peak at 104 over the weekend. Currently CP free. Plan for [facility] w/ PCI intervention today     Note not finalized until signed by attending 73 yo F with hx of HTN, asthma presented to [facility] with new onset ADHF and found to have +stress test, transferred to [facility] for LHC, which showed 70% D1, 80% dCx, 90% OM1, pRCA 60%, plan for PCI Monday.     #ADHF  - fine crackles to the bases, and K to 5.4. Can dose lasix 20mg IV x1 now. Hold on maintenance diuretics   - TTE showed LVEF 38% with poor wall visualization, likely global  - c/w coreg 6.25 BID  - c/w Entresto 97/103 BID   - Consider holding spironolactone 25 in light of this persistent hyperK x2 days  - eventual SGLT2i outpatient    #CAD  - c/w asa and statin as written   - hstrop peak at 104 over the weekend. Currently CP free. Plan for [facility] w/ PCI intervention today     Note not finalized until signed by attending 73 yo F with hx of HTN, asthma presented to [facility] with new onset ADHF and found to have +stress test, transferred to [facility] for LHC, which showed 70% D1, 80% dCx, 90% OM1, pRCA 60%, plan for PCI Monday.     #ADHF  - fine crackles to the bases, and K to 5.4. Can dose lasix 20mg IV x1 now. Hold on maintenance diuretics   - TTE showed LVEF 38% with poor wall visualization, likely global  - c/w coreg 6.25 BID  - c/w Entresto 97/103 BID   - Consider holding spironolactone 25 in light of this persistent hyperK x2 days  - eventual SGLT2i outpatient    #CAD  - c/w asa and statin as written   - hstrop peak at 104 over the weekend. Currently CP free. Plan for C w/ PCI intervention today.  +RHC     Note not finalized until signed by attending

## 2023-06-05 NOTE — PROGRESS NOTE ADULT - PROBLEM SELECTOR PROBLEM 2
Chronic HFrEF (heart failure with reduced ejection fraction) Acute HFrEF (heart failure with reduced ejection fraction)

## 2023-06-05 NOTE — PROGRESS NOTE ADULT - PROBLEM SELECTOR PLAN 1
Transferred from [facility] with shortness of breath with effusions, found to have acute CHFrEF (report not in chart)   Underwent LHC 6/2 with 90% prox diagonal and 80% OM   - plans for PCI on Monday   - c/w coreg  - c/w aspirin and atorvastatin   - c/w tele Transferred from [facility] with shortness of breath with effusions, found to have acute HFrEF (report not in chart)   Underwent LHC 6/2 with 90% prox diagonal and 80% OM   - plans for PCI on Monday   - c/w coreg  - c/w aspirin and atorvastatin   - c/w tele

## 2023-06-05 NOTE — PROGRESS NOTE ADULT - SUBJECTIVE AND OBJECTIVE BOX
PATIENT: CHRISTIANE OREILLY, MRN: [id number]    CHIEF COMPLAINT: Patient is a 72y old  Female who presents with a chief complaint of     INTERVAL HISTORY/OVERNIGHT EVENTS: No overnight events.     MEDICATIONS:  MEDICATIONS  (STANDING):  aspirin enteric coated 81 milliGRAM(s) Oral daily  atorvastatin 40 milliGRAM(s) Oral at bedtime  carvedilol 6.25 milliGRAM(s) Oral every 12 hours  enoxaparin Injectable 40 milliGRAM(s) SubCutaneous every 24 hours  mometasone 220 MICROgram(s) Inhaler 1 Puff(s) Inhalation daily  sacubitril 97 mG/valsartan 103 mG 1 Tablet(s) Oral two times a day  spironolactone 25 milliGRAM(s) Oral daily    MEDICATIONS  (PRN):  acetaminophen     Tablet .. 650 milliGRAM(s) Oral every 6 hours PRN Mild Pain (1 - 3)  albuterol    90 MICROgram(s) HFA Inhaler 2 Puff(s) Inhalation every 6 hours PRN for shortness of breath and/or wheezing  benzonatate 100 milliGRAM(s) Oral three times a day PRN Cough      ALLERGIES: Allergies    No Known Allergies    Intolerances        OBJECTIVE:  ICU Vital Signs Last 24 Hrs  T(C): 36.7 (05 Jun 2023 05:33), Max: 36.7 (04 Jun 2023 20:56)  T(F): 98 (05 Jun 2023 05:33), Max: 98 (04 Jun 2023 20:56)  HR: 69 (05 Jun 2023 05:33) (66 - 69)  BP: 178/92 (05 Jun 2023 05:33) (143/72 - 178/92)  BP(mean): --  ABP: --  ABP(mean): --  RR: 18 (05 Jun 2023 05:33) (18 - 18)  SpO2: 96% (05 Jun 2023 05:33) (96% - 97%)    O2 Parameters below as of 05 Jun 2023 05:33  Patient On (Oxygen Delivery Method): room air            Adult Advanced Hemodynamics Last 24 Hrs  CVP(mm Hg): --  CVP(cm H2O): --  CO: --  CI: --  PA: --  PA(mean): --  PCWP: --  SVR: --  SVRI: --  PVR: --  PVRI: --  CAPILLARY BLOOD GLUCOSE        CAPILLARY BLOOD GLUCOSE        I&O's Summary    04 Jun 2023 07:01  -  05 Jun 2023 07:00  --------------------------------------------------------  IN: 360 mL / OUT: 0 mL / NET: 360 mL      Daily     Daily     PHYSICAL EXAMINATION:  General: NAD  Eyes: Sclera clear  Neck: No palpable pre-auricular, post-auricular, occipital, mandibular, submental, supra-clavicular, or infra-clavicular lymph nodes   Chest: Clear to auscultation bilaterally; no rales, rhonchi, or wheezing appreciated  Heart: Regular rate and rhythm; intact S1 and S2; no obvious murmurs, rubs, or gallops  Abd: Soft, nontender to palpation, nondistended  Nervous System: Alert and oriented  Psych: Appropriate affect  Ext: no peripheral LE edema bilaterally      LABS:                          12.7   4.81  )-----------( 211      ( 04 Jun 2023 07:20 )             39.6     06-04    136  |  104  |  38<H>  ----------------------------<  132<H>  5.5<H>   |  21<L>  |  0.72    Ca    9.6      04 Jun 2023 07:17  Phos  3.5     06-04  Mg     2.2     06-04        PT/INR - ( 04 Jun 2023 07:20 )   PT: 12.0 sec;   INR: 1.04 ratio         PTT - ( 04 Jun 2023 07:20 )  PTT:30.5 sec       PATIENT: CHRISTIANE OREILLY, MRN: [id number]    CHIEF COMPLAINT: Patient is a 72y old  Female who presents with a chief complaint of     INTERVAL HISTORY/OVERNIGHT EVENTS: No overnight events.     MEDICATIONS:  MEDICATIONS  (STANDING):  aspirin enteric coated 81 milliGRAM(s) Oral daily  atorvastatin 40 milliGRAM(s) Oral at bedtime  carvedilol 6.25 milliGRAM(s) Oral every 12 hours  enoxaparin Injectable 40 milliGRAM(s) SubCutaneous every 24 hours  mometasone 220 MICROgram(s) Inhaler 1 Puff(s) Inhalation daily  sacubitril 97 mG/valsartan 103 mG 1 Tablet(s) Oral two times a day  spironolactone 25 milliGRAM(s) Oral daily    MEDICATIONS  (PRN):  acetaminophen     Tablet .. 650 milliGRAM(s) Oral every 6 hours PRN Mild Pain (1 - 3)  albuterol    90 MICROgram(s) HFA Inhaler 2 Puff(s) Inhalation every 6 hours PRN for shortness of breath and/or wheezing  benzonatate 100 milliGRAM(s) Oral three times a day PRN Cough      ALLERGIES: Allergies    No Known Allergies    Intolerances        OBJECTIVE:  ICU Vital Signs Last 24 Hrs  T(C): 36.7 (05 Jun 2023 05:33), Max: 36.7 (04 Jun 2023 20:56)  T(F): 98 (05 Jun 2023 05:33), Max: 98 (04 Jun 2023 20:56)  HR: 69 (05 Jun 2023 05:33) (66 - 69)  BP: 178/92 (05 Jun 2023 05:33) (143/72 - 178/92)  BP(mean): --  ABP: --  ABP(mean): --  RR: 18 (05 Jun 2023 05:33) (18 - 18)  SpO2: 96% (05 Jun 2023 05:33) (96% - 97%)    O2 Parameters below as of 05 Jun 2023 05:33  Patient On (Oxygen Delivery Method): room air      I&O's Summary    04 Jun 2023 07:01  -  05 Jun 2023 07:00  --------------------------------------------------------  IN: 360 mL / OUT: 0 mL / NET: 360 mL      PHYSICAL EXAMINATION:  General: NAD  Eyes: Sclera clear  Neck: No palpable pre-auricular, post-auricular, occipital, mandibular, submental, supra-clavicular, or infra-clavicular lymph nodes   Chest: Clear to auscultation bilaterally; no rales, rhonchi, or wheezing appreciated  Heart: Regular rate and rhythm; intact S1 and S2; no obvious murmurs, rubs, or gallops  Abd: Soft, nontender to palpation, nondistended  Nervous System: Alert and oriented  Psych: Appropriate affect  Ext: no peripheral LE edema bilaterally      LABS:                          12.7   4.81  )-----------( 211      ( 04 Jun 2023 07:20 )             39.6     06-04    136  |  104  |  38<H>  ----------------------------<  132<H>  5.5<H>   |  21<L>  |  0.72    Ca    9.6      04 Jun 2023 07:17  Phos  3.5     06-04  Mg     2.2     06-04        PT/INR - ( 04 Jun 2023 07:20 )   PT: 12.0 sec;   INR: 1.04 ratio         PTT - ( 04 Jun 2023 07:20 )  PTT:30.5 sec

## 2023-06-05 NOTE — PROGRESS NOTE ADULT - SUBJECTIVE AND OBJECTIVE BOX
Ezra Pichardo MD  Cardiology Fellow  [phone]  All Cardiology service information can be found 24/7 on [email or web address], password: [email or web address]    Patient seen and examined at bedside.  Over weekend, with chest pain sat. Hstrop 104 > 91  Otherwise appears to have achieved euvolemia  Plan for cath today     Review Of Systems: No chest pain, shortness of breath, or palpitations            Current Meds:  acetaminophen     Tablet .. 650 milliGRAM(s) Oral every 6 hours PRN  albuterol    90 MICROgram(s) HFA Inhaler 2 Puff(s) Inhalation every 6 hours PRN  aspirin enteric coated 81 milliGRAM(s) Oral daily  atorvastatin 40 milliGRAM(s) Oral at bedtime  benzonatate 100 milliGRAM(s) Oral three times a day PRN  carvedilol 6.25 milliGRAM(s) Oral every 12 hours  enoxaparin Injectable 40 milliGRAM(s) SubCutaneous every 24 hours  mometasone 220 MICROgram(s) Inhaler 1 Puff(s) Inhalation daily  sacubitril 97 mG/valsartan 103 mG 1 Tablet(s) Oral two times a day  spironolactone 25 milliGRAM(s) Oral daily    Vitals:  T(F): 98 (06-05), Max: 98 (06-04)  HR: 69 (06-05) (66 - 69)  BP: 178/92 (06-05) (143/72 - 178/92)  RR: 18 (06-05)  SpO2: 96% (06-05)  I&O's Summary    03 Jun 2023 07:01  -  04 Jun 2023 07:00  --------------------------------------------------------  IN: 180 mL / OUT: 0 mL / NET: 180 mL    04 Jun 2023 07:01  -  05 Jun 2023 06:30  --------------------------------------------------------  IN: 360 mL / OUT: 0 mL / NET: 360 mL    Physical Exam:  Appearance: No acute distress; well appearing  Eyes: PERRL, EOMI, pink conjunctiva  HEENT: Normal oral mucosa  Cardiovascular: RRR, S1, S2, no murmurs, rubs, or gallops; no edema; no JVD  Respiratory: Clear to auscultation bilaterally  Gastrointestinal: soft, non-tender, non-distended with normal bowel sounds  Musculoskeletal: No clubbing; no joint deformity   Neurologic: Non-focal  Lymphatic: No lymphadenopathy  Psychiatry: AAOx3, mood & affect appropriate  Skin: No rashes, ecchymoses, or cyanosis                          12.7   4.81  )-----------( 211      ( 04 Jun 2023 07:20 )             39.6     06-04    136  |  104  |  38<H>  ----------------------------<  132<H>  5.5<H>   |  21<L>  |  0.72    Ca    9.6      04 Jun 2023 07:17  Phos  3.5     06-04  Mg     2.2     06-04      PT/INR - ( 04 Jun 2023 07:20 )   PT: 12.0 sec;   INR: 1.04 ratio         PTT - ( 04 Jun 2023 07:20 )  PTT:30.5 sec  CARDIAC MARKERS ( 04 Jun 2023 01:03 )  91 ng/L / x     / x     / x     / x     / x      CARDIAC MARKERS ( 03 Jun 2023 17:39 )  104 ng/L / x     / x     / x     / x     / x        TTE  CONCLUSIONS:   1. Left ventricular endocardium is not well visualized; however, the left ventricular systolic function appears moderately to severely reduced.   2. Mildly dilated left ventricular cavity size. There is global left ventricular hypokinesis.   3. The right ventricle is not well visualized.   4. Fibrocalcific aortic valve sclerosis without stenosis.   5. No pericardial effusion seen.    Cath 6/2  Coronary Angiography   The coronary circulation is co-dominant.      LM   Left main artery: Angiography shows minor irregularities.      LAD   Left anterior descending artery: Angiography shows minor  irregularities. Distal left anterior descending: Angiography shows  mild  atherosclerosis. splits into dual system. First diagonal: There is a  70 % stenosis.    CX   Distal circumflex: There is an 80 % stenosis. First obtuse marginal:  There is a 90 % stenosis.    RCA   Proximal right coronary artery: There is a 60 % stenosis.       Ezra Pichardo MD  Cardiology Fellow  [phone]  All Cardiology service information can be found 24/7 on [email or web address], password: [email or web address]    Patient seen and examined at bedside.  Over weekend, with chest pain sat. Hstrop 104 > 91  SR 60-90  Fine crackles at the bases. K 5.4  Plan for cath today     Review Of Systems: No chest pain, shortness of breath, or palpitations            Current Meds:  acetaminophen     Tablet .. 650 milliGRAM(s) Oral every 6 hours PRN  albuterol    90 MICROgram(s) HFA Inhaler 2 Puff(s) Inhalation every 6 hours PRN  aspirin enteric coated 81 milliGRAM(s) Oral daily  atorvastatin 40 milliGRAM(s) Oral at bedtime  benzonatate 100 milliGRAM(s) Oral three times a day PRN  carvedilol 6.25 milliGRAM(s) Oral every 12 hours  enoxaparin Injectable 40 milliGRAM(s) SubCutaneous every 24 hours  mometasone 220 MICROgram(s) Inhaler 1 Puff(s) Inhalation daily  sacubitril 97 mG/valsartan 103 mG 1 Tablet(s) Oral two times a day  spironolactone 25 milliGRAM(s) Oral daily    Vitals:  T(F): 98 (06-05), Max: 98 (06-04)  HR: 69 (06-05) (66 - 69)  BP: 178/92 (06-05) (143/72 - 178/92)  RR: 18 (06-05)  SpO2: 96% (06-05)  I&O's Summary    03 Jun 2023 07:01  -  04 Jun 2023 07:00  --------------------------------------------------------  IN: 180 mL / OUT: 0 mL / NET: 180 mL    04 Jun 2023 07:01  -  05 Jun 2023 06:30  --------------------------------------------------------  IN: 360 mL / OUT: 0 mL / NET: 360 mL    Physical Exam:  Appearance: No acute distress; well appearing  Eyes: PERRL, EOMI, pink conjunctiva  HEENT: Normal oral mucosa  Cardiovascular: RRR, S1, S2, no murmurs, rubs, or gallops; no edema; no JVD  Respiratory: Fine crackles to the bases  Gastrointestinal: soft, non-tender, non-distended with normal bowel sounds  Musculoskeletal: No clubbing; no joint deformity   Neurologic: Non-focal  Lymphatic: No lymphadenopathy  Psychiatry: AAOx3, mood & affect appropriate  Skin: No rashes, ecchymoses, or cyanosis                          12.7   4.81  )-----------( 211      ( 04 Jun 2023 07:20 )             39.6     06-04    136  |  104  |  38<H>  ----------------------------<  132<H>  5.5<H>   |  21<L>  |  0.72    Ca    9.6      04 Jun 2023 07:17  Phos  3.5     06-04  Mg     2.2     06-04      PT/INR - ( 04 Jun 2023 07:20 )   PT: 12.0 sec;   INR: 1.04 ratio         PTT - ( 04 Jun 2023 07:20 )  PTT:30.5 sec  CARDIAC MARKERS ( 04 Jun 2023 01:03 )  91 ng/L / x     / x     / x     / x     / x      CARDIAC MARKERS ( 03 Jun 2023 17:39 )  104 ng/L / x     / x     / x     / x     / x        TTE  CONCLUSIONS:   1. Left ventricular endocardium is not well visualized; however, the left ventricular systolic function appears moderately to severely reduced.   2. Mildly dilated left ventricular cavity size. There is global left ventricular hypokinesis.   3. The right ventricle is not well visualized.   4. Fibrocalcific aortic valve sclerosis without stenosis.   5. No pericardial effusion seen.    Cath 6/2  Coronary Angiography   The coronary circulation is co-dominant.      LM   Left main artery: Angiography shows minor irregularities.      LAD   Left anterior descending artery: Angiography shows minor  irregularities. Distal left anterior descending: Angiography shows  mild  atherosclerosis. splits into dual system. First diagonal: There is a  70 % stenosis.    CX   Distal circumflex: There is an 80 % stenosis. First obtuse marginal:  There is a 90 % stenosis.    RCA   Proximal right coronary artery: There is a 60 % stenosis.

## 2023-06-06 ENCOUNTER — FORM ENCOUNTER (OUTPATIENT)
Age: 73
End: 2023-06-06

## 2023-06-06 ENCOUNTER — TRANSCRIPTION ENCOUNTER (OUTPATIENT)
Age: 73
End: 2023-06-06

## 2023-06-06 VITALS
TEMPERATURE: 98 F | DIASTOLIC BLOOD PRESSURE: 80 MMHG | HEART RATE: 68 BPM | SYSTOLIC BLOOD PRESSURE: 150 MMHG | OXYGEN SATURATION: 95 % | RESPIRATION RATE: 18 BRPM

## 2023-06-06 LAB
ANION GAP SERPL CALC-SCNC: 14 MMOL/L — SIGNIFICANT CHANGE UP (ref 5–17)
BUN SERPL-MCNC: 29 MG/DL — HIGH (ref 7–23)
CALCIUM SERPL-MCNC: 10 MG/DL — SIGNIFICANT CHANGE UP (ref 8.4–10.5)
CHLORIDE SERPL-SCNC: 101 MMOL/L — SIGNIFICANT CHANGE UP (ref 96–108)
CO2 SERPL-SCNC: 19 MMOL/L — LOW (ref 22–31)
CREAT SERPL-MCNC: 0.65 MG/DL — SIGNIFICANT CHANGE UP (ref 0.5–1.3)
EGFR: 93 ML/MIN/1.73M2 — SIGNIFICANT CHANGE UP
GLUCOSE SERPL-MCNC: 113 MG/DL — HIGH (ref 70–99)
HCT VFR BLD CALC: 40.5 % — SIGNIFICANT CHANGE UP (ref 34.5–45)
HGB BLD-MCNC: 12.9 G/DL — SIGNIFICANT CHANGE UP (ref 11.5–15.5)
MAGNESIUM SERPL-MCNC: 2 MG/DL — SIGNIFICANT CHANGE UP (ref 1.6–2.6)
MCHC RBC-ENTMCNC: 29.4 PG — SIGNIFICANT CHANGE UP (ref 27–34)
MCHC RBC-ENTMCNC: 31.9 GM/DL — LOW (ref 32–36)
MCV RBC AUTO: 92.3 FL — SIGNIFICANT CHANGE UP (ref 80–100)
NRBC # BLD: 0 /100 WBCS — SIGNIFICANT CHANGE UP (ref 0–0)
PHOSPHATE SERPL-MCNC: 2.6 MG/DL — SIGNIFICANT CHANGE UP (ref 2.5–4.5)
PLATELET # BLD AUTO: 226 K/UL — SIGNIFICANT CHANGE UP (ref 150–400)
POTASSIUM SERPL-MCNC: 4.8 MMOL/L — SIGNIFICANT CHANGE UP (ref 3.5–5.3)
POTASSIUM SERPL-SCNC: 4.8 MMOL/L — SIGNIFICANT CHANGE UP (ref 3.5–5.3)
RBC # BLD: 4.39 M/UL — SIGNIFICANT CHANGE UP (ref 3.8–5.2)
RBC # FLD: 16.5 % — HIGH (ref 10.3–14.5)
SODIUM SERPL-SCNC: 134 MMOL/L — LOW (ref 135–145)
WBC # BLD: 5.14 K/UL — SIGNIFICANT CHANGE UP (ref 3.8–10.5)
WBC # FLD AUTO: 5.14 K/UL — SIGNIFICANT CHANGE UP (ref 3.8–10.5)

## 2023-06-06 PROCEDURE — 93010 ELECTROCARDIOGRAM REPORT: CPT

## 2023-06-06 PROCEDURE — 99232 SBSQ HOSP IP/OBS MODERATE 35: CPT | Mod: GC

## 2023-06-06 PROCEDURE — 99239 HOSP IP/OBS DSCHRG MGMT >30: CPT | Mod: GC

## 2023-06-06 RX ORDER — SACUBITRIL AND VALSARTAN 24; 26 MG/1; MG/1
1 TABLET, FILM COATED ORAL
Qty: 60 | Refills: 0
Start: 2023-06-06 | End: 2023-07-05

## 2023-06-06 RX ORDER — SPIRONOLACTONE 25 MG/1
0.5 TABLET, FILM COATED ORAL
Qty: 15 | Refills: 0
Start: 2023-06-06 | End: 2023-07-05

## 2023-06-06 RX ORDER — CARVEDILOL PHOSPHATE 80 MG/1
1 CAPSULE, EXTENDED RELEASE ORAL
Qty: 60 | Refills: 0
Start: 2023-06-06 | End: 2023-07-05

## 2023-06-06 RX ORDER — ATORVASTATIN CALCIUM 80 MG/1
1 TABLET, FILM COATED ORAL
Qty: 30 | Refills: 0
Start: 2023-06-06 | End: 2023-07-05

## 2023-06-06 RX ORDER — CLOPIDOGREL BISULFATE 75 MG/1
1 TABLET, FILM COATED ORAL
Qty: 30 | Refills: 2
Start: 2023-06-06 | End: 2023-09-03

## 2023-06-06 RX ORDER — FUROSEMIDE 40 MG
20 TABLET ORAL
Refills: 0 | DISCHARGE

## 2023-06-06 RX ORDER — SACUBITRIL AND VALSARTAN 24; 26 MG/1; MG/1
1 TABLET, FILM COATED ORAL
Refills: 0 | DISCHARGE

## 2023-06-06 RX ORDER — ASPIRIN/CALCIUM CARB/MAGNESIUM 324 MG
1 TABLET ORAL
Refills: 0 | DISCHARGE

## 2023-06-06 RX ORDER — ATORVASTATIN CALCIUM 80 MG/1
1 TABLET, FILM COATED ORAL
Refills: 0 | DISCHARGE

## 2023-06-06 RX ORDER — ATORVASTATIN CALCIUM 80 MG/1
1 TABLET, FILM COATED ORAL
Qty: 0 | Refills: 0 | DISCHARGE
Start: 2023-06-06

## 2023-06-06 RX ORDER — ASPIRIN/CALCIUM CARB/MAGNESIUM 324 MG
1 TABLET ORAL
Qty: 30 | Refills: 2
Start: 2023-06-06 | End: 2023-09-03

## 2023-06-06 RX ORDER — FLUTICASONE PROPIONATE 220 MCG
1 AEROSOL WITH ADAPTER (GRAM) INHALATION
Refills: 0 | DISCHARGE

## 2023-06-06 RX ORDER — ALBUTEROL 90 UG/1
2 AEROSOL, METERED ORAL
Refills: 0 | DISCHARGE

## 2023-06-06 RX ORDER — CARVEDILOL PHOSPHATE 80 MG/1
1 CAPSULE, EXTENDED RELEASE ORAL
Refills: 0 | DISCHARGE

## 2023-06-06 RX ORDER — FLUTICASONE PROPIONATE 220 MCG
1 AEROSOL WITH ADAPTER (GRAM) INHALATION
Qty: 1 | Refills: 2
Start: 2023-06-06

## 2023-06-06 RX ORDER — ALBUTEROL 90 UG/1
2 AEROSOL, METERED ORAL
Qty: 1 | Refills: 2
Start: 2023-06-06

## 2023-06-06 RX ORDER — CARVEDILOL PHOSPHATE 80 MG/1
1 CAPSULE, EXTENDED RELEASE ORAL
Qty: 0 | Refills: 0 | DISCHARGE
Start: 2023-06-06

## 2023-06-06 RX ADMIN — SPIRONOLACTONE 12.5 MILLIGRAM(S): 25 TABLET, FILM COATED ORAL at 05:15

## 2023-06-06 RX ADMIN — CLOPIDOGREL BISULFATE 75 MILLIGRAM(S): 75 TABLET, FILM COATED ORAL at 10:59

## 2023-06-06 RX ADMIN — Medication 81 MILLIGRAM(S): at 11:00

## 2023-06-06 RX ADMIN — Medication 100 MILLIGRAM(S): at 12:50

## 2023-06-06 RX ADMIN — SACUBITRIL AND VALSARTAN 1 TABLET(S): 24; 26 TABLET, FILM COATED ORAL at 10:59

## 2023-06-06 RX ADMIN — CARVEDILOL PHOSPHATE 6.25 MILLIGRAM(S): 80 CAPSULE, EXTENDED RELEASE ORAL at 05:15

## 2023-06-06 NOTE — DISCHARGE NOTE PROVIDER - NSDCMRMEDTOKEN_GEN_ALL_CORE_FT
Albuterol (Eqv-ProAir HFA) 90 mcg/inh inhalation aerosol: 2 inhaled every 6 hours as needed for  shortness of breath and/or wheezing HOME and HOSP  aspirin 81 mg oral tablet: 1 orally once a day HOSP  carvedilol 6.25 mg oral tablet: 1 orally 2 times a day HOSP  Entresto 49 mg-51 mg oral tablet: 1 orally once a day HOSP  Flovent Diskus 50 mcg/inh inhalation powder: 1 inhaled 2 times a day HOSP  furosemide 100 mg/100 mL-0.9% intravenous solution: 20 milligram(s) intravenous once a day HOSP  Lipitor 40 mg oral tablet: 1 orally once a day (at bedtime) HOSP   Albuterol (Eqv-ProAir HFA) 90 mcg/inh inhalation aerosol: 2 inhaled every 6 hours as needed for  shortness of breath and/or wheezing HOME and HOSP  aspirin 81 mg oral delayed release tablet: 1 tab(s) orally once a day  atorvastatin 40 mg oral tablet: 1 tab(s) orally once a day (at bedtime)  carvedilol 6.25 mg oral tablet: 1 tab(s) orally every 12 hours  clopidogrel 75 mg oral tablet: 1 tab(s) orally once a day  Flovent Diskus 50 mcg/inh inhalation powder: 1 inhaled 2 times a day HOSP  furosemide 100 mg/100 mL-0.9% intravenous solution: 20 milligram(s) intravenous once a day HOSP  sacubitril-valsartan 97 mg-103 mg oral tablet: 1 tab(s) orally 2 times a day  spironolactone 25 mg oral tablet: 0.5 tab(s) orally once a day   Albuterol (Eqv-ProAir HFA) 90 mcg/inh inhalation aerosol: 2 puff(s) inhaled every 6 hours as needed for  shortness of breath and/or wheezing HOME and [facility]  aspirin 81 mg oral delayed release tablet: 1 tab(s) orally once a day  atorvastatin 40 mg oral tablet: 1 tab(s) orally once a day (at bedtime)  carvedilol 6.25 mg oral tablet: 1 tab(s) orally every 12 hours  clopidogrel 75 mg oral tablet: 1 tab(s) orally once a day  Flovent Diskus 50 mcg/inh inhalation powder: 1 puff(s) inhaled 2 times a day HOSP  sacubitril-valsartan 97 mg-103 mg oral tablet: 1 tab(s) orally 2 times a day  spironolactone 25 mg oral tablet: 0.5 tab(s) orally once a day

## 2023-06-06 NOTE — PROGRESS NOTE ADULT - PROBLEM SELECTOR PLAN 4
DVT PPx: Lovenox 40  Diet: DASH   Dispo: Medically active    Code Status: Full Code

## 2023-06-06 NOTE — DISCHARGE NOTE PROVIDER - NSDCCAREPROVSEEN_GEN_ALL_CORE_FT
[facility] TEAM 7 [facility] TEAM 7    Attending Dr. Tommie Sims

## 2023-06-06 NOTE — DISCHARGE NOTE NURSING/CASE MANAGEMENT/SOCIAL WORK - PATIENT PORTAL LINK FT
You can access the FollowMyHealth Patient Portal offered by [facility] by registering at the following website: [email or web address]. By joining [facility]’s FollowMyHealth portal, you will also be able to view your health information using other applications (apps) compatible with our system.

## 2023-06-06 NOTE — DISCHARGE NOTE PROVIDER - NSDCFUADDAPPT_GEN_ALL_CORE_FT
Please call to schedule an appointment with cardiologist above. You can also schedule an appt at the clinic listed above for primary care needs. Please take your medications as prescribed.

## 2023-06-06 NOTE — DISCHARGE NOTE PROVIDER - NSDCCPTREATMENT_GEN_ALL_CORE_FT
PRINCIPAL PROCEDURE  Procedure: Transthoracic echo  Findings and Treatment:   < end of copied text >  Mildly dilated left ventricular cavity size. The left ventricular wall thickness is normal. The left ventricular systolic function is severely decreased with a calculated ejection fraction of 38 % by the Arias's biplane method of disks. There is global left ventricular hypokinesis. Unable to assess left ventricular diastolic function due to insufficient data. Left ventricular endocardium is not well visualized; however, the left ventricular systolic function appears moderately to severely reduced. There is no evidence of a thrombus in the left ventricle.  < end of copied text >  CONCLUSIONS:      1. Left ventricular endocardium is not well visualized; however, the left ventricular systolic function appears moderately to severely reduced.   2. Mildly dilated left ventricular cavity size. There is global left ventricular hypokinesis.   3. The right ventricle is not well visualized.   4. Fibrocalcific aortic valve sclerosis without stenosis.   5. No pericardial effusion seen.< from: TTE W or WO Ultrasound Enhancing Agent (06.02.23 @ 17:03) >  < from: TTE W or WO Ultrasound Enhancing Agent (06.02.23 @ 17:03) >

## 2023-06-06 NOTE — DISCHARGE NOTE PROVIDER - HOSPITAL COURSE
72 year old female h/o HTN (only med at home Dilt and Albuterol), asthma (no intubations) presents with shortness of breath found to have CHFrEF, transferred from [facility] for [facility].    Hospital course:    72 year old female h/o HTN (only med at home Dilt and Albuterol), asthma (no intubations) presents with shortness of breath found to have CHFrEF, transferred from [facility] for C.    Hospital course:   On arrival, patient HDS. Underwent LHC with multivessel obstructive CAD and TTE which showed global systolic dysfunction with EF of 38%. Patient on aspirin and continued with GDMT started at [facility] including entresto and coreg. Was also started on spironolactone (reduced to 12.5mg due to hyperkalemia? which corrected to < 5). Pt was loaded with plavix and underwent PCI on 6/5 with dCX stent x 2 (80%) and OM stent x 2 (90%). Continued with DAPT and GDMT. Patient without SOB and chest pain free, no arrhythmias on telemetry.     Patient deemed medically stable for discharge home. 72 year old female h/o HTN (only med at home Diltiazem and Albuterol), asthma (no intubations) presents with shortness of breath found to have HFrEF, transferred from [facility] for [facility].    Hospital course:   On arrival, patient HDS. Underwent LHC with multivessel obstructive CAD and TTE which showed global systolic dysfunction with EF of 38%. Patient on aspirin and continued with GDMT started at [facility] including entresto and coreg. Was also started on spironolactone (reduced to 12.5mg due to mild which normalized prior to discharge). Pt was loaded with plavix and underwent PCI on 6/5 with dCX stent x 2 (80%) and OM stent x 2 (90%). Continued with DAPT and GDMT. Patient without SOB and chest pain free, no arrhythmias on telemetry. Euvolemic at time of discharge    Patient deemed medically stable for discharge home, with plan for close outpatient follow-up with cardiology. Medications sent to pharmacy for home delivery on the day of discharge. Social work recruited for assistance in accessing medications given patient's uninsured status.    Discharge Diagnoses:  Acute HFrEF  Coronary Artery Disease s/p PCI x 4  Hyperkalemia, medication-induced, resolved

## 2023-06-06 NOTE — PROGRESS NOTE ADULT - SUBJECTIVE AND OBJECTIVE BOX
Interventional Cardiology Post Cath Progress Note:                Subjective:   Patient feels well- no current complaints- Denies chest pain, shortness of breath. Denies pain, numbness, tingling or swelling around right radial access site. patient ambulated from the bathroom to the bed without discomfort.      MEDICATIONS  (STANDING):  aspirin enteric coated 81 milliGRAM(s) Oral daily  atorvastatin 40 milliGRAM(s) Oral at bedtime  carvedilol 6.25 milliGRAM(s) Oral every 12 hours  clopidogrel Tablet 75 milliGRAM(s) Oral daily  enoxaparin Injectable 40 milliGRAM(s) SubCutaneous every 24 hours  mometasone 220 MICROgram(s) Inhaler 1 Puff(s) Inhalation daily  sacubitril 97 mG/valsartan 103 mG 1 Tablet(s) Oral two times a day  spironolactone 12.5 milliGRAM(s) Oral daily    MEDICATIONS  (PRN):  acetaminophen     Tablet .. 650 milliGRAM(s) Oral every 6 hours PRN Mild Pain (1 - 3)  albuterol    90 MICROgram(s) HFA Inhaler 2 Puff(s) Inhalation every 6 hours PRN for shortness of breath and/or wheezing  benzonatate 100 milliGRAM(s) Oral three times a day PRN Cough      Objective:  Vital Signs Last 24 Hrs  T(C): 36.9 (06 Jun 2023 08:59), Max: 36.9 (06 Jun 2023 08:59)  T(F): 98.5 (06 Jun 2023 08:59), Max: 98.5 (06 Jun 2023 08:59)  HR: 67 (06 Jun 2023 08:59) (62 - 80)  BP: 122/71 (06 Jun 2023 08:59) (122/71 - 170/72)  BP(mean): 86 (05 Jun 2023 17:35) (84 - 154)  RR: 18 (06 Jun 2023 08:59) (18 - 18)  SpO2: 96% (06 Jun 2023 08:59) (95% - 100%)    Parameters below as of 06 Jun 2023 08:59  Patient On (Oxygen Delivery Method): room air        06-05-23 @ 07:01  -  06-06-23 @ 07:00  --------------------------------------------------------  IN: 360 mL / OUT: 800 mL / NET: -440 mL                              12.9   5.14  )-----------( 226      ( 06 Jun 2023 06:53 )             40.5     06-06    134<L>  |  101  |  29<H>  ----------------------------<  113<H>  4.8   |  19<L>  |  0.65    Ca    10.0      06 Jun 2023 06:53  Phos  2.6     06-06  Mg     2.0     06-06          Physical Exam:  No apparent distress, alert and oriented times three, appropriate affect  Right Upper Extremity: Soft, non tender, no bleeding or hematoma, clean/dry/intact- RUE +2 palpable radial pulse  right radial access site assessed with no evidence of hematoma, no swelling, and no signs of bleeding  Dressing clean, dry, and intact.   radial pulses are palpable and symmetric bilaterally. Sensation is intact with full ROM of the right extremity   rue examined with absence of tenderness and ecchymosis      Assessment/Plan:   HPI:  72 year old female h/o HTN (only med at home Dilt and Albuterol), asthma (no intubations) who noted LE edema for a few weeks and then awoke at from sleep SOB and coughing early Tues AM 5/30 and   presented to [facility] on 5/30/23 with new onset HFrEF and had a positive stress test. Pt transferred to [facility] today for C    6/5 S/P PCI to dCx x2 VIANCA        S/P PCI to OM x 2 VIANCA  total 4 stents      - Continue DAPT (aspirin 81mg and clopidogrel 75mg)  - Continue statin (atorvastatin). Statin education reviewed and reinforced with patient and her daughter   - Recommend a heart healthy diet which includes a variety of fruits and vegetables, whole grains, low fat dairy products, legumes and skinless poultry and fish; food prepared with little or no salt and minimize processed foods  - Avoid using NSAIDs  (Aleve, Motrin, ibuprofen, naproxen) while on DAPT, please utilize Tylenol for pain control (not to exceed 4gm in 24 hours)  - Patient aware to take DAPT  as prescribed and DO NOT STOP taking without consulting cardiologist first or STENT/s WILL CLOSE  - Reviewed and reinforced with patient:  site complications ( eg: bleeding, excruciating pain at the procedural site, large swelling ball size-  extremity numbness, tingling, temperature change), or CHEST PAIN; pt aware that if any of those occur he/she must call cardiologist IMMEDIATELY or 911 or go to nearest emergency room   - Patient verbalizes understanding of ALL OF THE ABOVE, and gives positive feedback   -please make sure DAPT is prescribed to pt's preferred pharmacy on dc   -f/u appt in 2 weeks post dc with outpt cardiologist  - Keep Mg >2 K>4   - cont to monitor on tele  - all other care as per primary     Please check [email or web address] password [email or web address] for cardiology service schedule and contact information via TEAMS.

## 2023-06-06 NOTE — DISCHARGE NOTE PROVIDER - NSDCCPCAREPLAN_GEN_ALL_CORE_FT
PRINCIPAL DISCHARGE DIAGNOSIS  Diagnosis: CAD (coronary artery disease)  Assessment and Plan of Treatment: You presented to the hospital with shortness of breath and were found to have plaques obstructing your coronary arteries which supply the heart. You were taken for catheterization and 4 stents were placed to open the arteries. You were started on 2 antiplatelet medications aspirin and plavix, which will keep the vessels open to allow blood flow. Take  as prescribed and DO NOT STOP taking without consulting cardiologist first or STENT/s WILL CLOSE.   Recommend a heart healthy diet which includes a variety of fruits and vegetables, whole grains, low fat dairy products, legumes and skinless poultry and fish; food prepared with little or no salt and minimize processed foods  Avoid using NSAIDs  (Aleve, Motrin, ibuprofen, naproxen) while on DAPT, please utilize Tylenol for pain control (not to exceed 4gm in 24 hours)  If extremity numbness, tingling, temperature change), or CHEST PAIN call cardiologist IMMEDIATELY or 911 or go to nearest emergency room.          SECONDARY DISCHARGE DIAGNOSES  Diagnosis: Acute HFrEF (heart failure with reduced ejection fraction)  Assessment and Plan of Treatment: You were also found to have reduced pumping function of the heart due to the arteries being blocked. Please take your medications as prescribed and follow up with a cardiologist.

## 2023-06-06 NOTE — DISCHARGE NOTE NURSING/CASE MANAGEMENT/SOCIAL WORK - NSDCPEFALRISK_GEN_ALL_CORE
For information on Fall & Injury Prevention, visit: [email or web address] OR  [email or web address] OR  https://www.cdc.gov/steadi/patient.html

## 2023-06-06 NOTE — PROGRESS NOTE ADULT - ATTENDING COMMENTS
s/p PCI with VIANCA  on DAPT  on optimal medical management - will c/w the BB, entresto, aldactone  no further work up at this time
awaitng cardiac cath today - ideally both right and left heart cath  can dose lasix for volum overload - especially once objective data is obtained  monitor the hyperhalemia - would holf the aldactone  echo results noted
72F hx HTN, asthma (no intubations) presents as a transfer from OSH for new onset HFrEF and had a positive stress test. Pt transferred to [facility] 6/2 s/p LHC  -Plan is for TTE/viability study and eventual stage PCI possibly Monday  -C/w asprin 81mg   -C/w coreg and lipitor   -TTE pending   -Currently appears euvolemic   -Cardiology follow up   Rest per above
Patient seen and examined with daughter at bedside this morning.    She is s/p cardiac catheterization yesterday with PCI x 4 performed via R radial approach. R radial site without evidence of bleeding or hematoma. Lungs are clear to auscultation. Heart sounds are normal. There is no LE edema. There is no report of bleeding, chest pain, palpitations, or dizziness.    From a HF perspective, she is well compensated. Filling pressures noted on yesterday's catheterization support this as well.  From a CAD perspective, she does not have chest pain and appears to have tolerated yesterday's procedure well.    Medically stable for discharge home today. Findings and plan of care d/w Dr. Alston over the phone. Plan to continue current GDMT, which she is tolerating well, and DAPT, with close outpatient f/u with Dr. Chester to be arranged for next week. Repeat BMP recommended to ensure creatinine has remained stable and hyperkalemia has not recurred    Medications sent to pharmacy for home delivery today. Social work recruited for assistance given uninsured status.    Discharge time: 40 minutes
72F hx HTN, asthma (no intubations) presents as a transfer from OSH for new onset HFrEF and had a positive stress test. Pt transferred to [facility] 6/2 s/p LHC. TTE with EF 38%, global reduction of LVEF  -Pending stage PCI possibly Monday  -C/w asprin 81mg and plavix  -C/w coreg and lipitor   -Currently appears euvolemic   -Cardiology follow up   Rest per resident documentation above    D/W pt and grandson at bedside    [facility] Division of Hospital Medicine  Priyanka Parr MD  Pager (M-F, 8A-5P): [phone]  Other Times:  [phone] .     Time spent: 40 minutes
Reports feeling well this morning. Only complaint is a "tickle in her throat" after receiving an albuterol treatment earlier in her hospitalization. Denies chest pain. No dyspnea. No palpitations. No fever/chills. No nausea/vomiting. No lightheadedness/dizziness.    No arrhythmia noted on telemetry monitoring.    1. Coronary Artery Disease  - Plan for cardiac catheterization today for possible intervention in setting of multi-vessel disease  - Per discussion with Dr. Alston, will initiate plavix load prior to procedure. Dose of 600 mg x 1 ordered.  - Continue aspirin 81 mg PO daily  - Continue beta blocker  - If RHC also performed, will follow up on filling pressures to help guide management of HFrEF  - Continue telemetry monitoring    2. Acute HFrEF, suspect advanced ischemic cardiomyopathy  - Clinically euvolemic at this time; follow-up cath results for filling pressures  - Continue entresto as tolerated  - Continue coreg as tolerated  - Decrease spironolactone to 12.5 mg PO daily in setting of modest hyperkalemia  - Monitor Is and Os, daily weights  - Cardiology f/u; would benefit from close outpatient HF management and follow-up    3. Hyperkalemia - likely secondary to spironolactone/entresto initiation  - Trend BMP  - Decrease dose of spironolactone  - Monitor on telemetry    Discharge planning to home as early as tomorrow pending catheterization results. Social work to evaluate patient given lack of insurance to ensure a safe transition to the outpatient setting; access to chronic medications, especially antiplatelet therapy, will be paramount.    Plan of care discussed with cardiology, Dr. Alston, over the phone.  Plan of care discussed with patient and family at bedside this morning as well.
72F hx HTN, asthma (no intubations) presents as a transfer from OSH for new onset HFrEF and had a positive stress test. Pt transferred to [facility] 6/2 s/p LHC. TTE with EF 38%, global reduction of LVEF  -Pending stage PCI possibly Monday  -C/w asprin 81mg and plavix  -C/w coreg and lipitor   -Currently appears euvolemic   -Cardiology follow up   Rest per resident documentation above    D/W pt and daughter at bedside    [facility] Division of Hospital Medicine  Priyanka Parr MD  Pager (M-F, 8A-5P): [phone]  Other Times:  [phone]

## 2023-06-06 NOTE — PROGRESS NOTE ADULT - TIME BILLING
left and right heart cath
s/p pci  medical mgt
- preparing to see the patient (eg, review of tests, documents)  - obtaining and/or reviewing separately obtained history  - performing a medically appropriate examination and/or evaluation  - counseling and educating the patient/family/caregiver  - ordering medications, tests, or procedures  - referring and communicating with other health care professionals  - documenting clinical information in the electronic or other health record  - independently interpreting results and communicating results to the patient/family/caregiver  - care coordination

## 2023-06-06 NOTE — PROGRESS NOTE ADULT - SUBJECTIVE AND OBJECTIVE BOX
PATIENT: CHRISTIANE OREILLY, MRN: [id number]    CHIEF COMPLAINT: Patient is a 72y old  Female who presents with a chief complaint of     INTERVAL HISTORY/OVERNIGHT EVENTS: No overnight events. Patient denies chest pain, shortness of breath.     MEDICATIONS:  MEDICATIONS  (STANDING):  aspirin enteric coated 81 milliGRAM(s) Oral daily  atorvastatin 40 milliGRAM(s) Oral at bedtime  carvedilol 6.25 milliGRAM(s) Oral every 12 hours  clopidogrel Tablet 75 milliGRAM(s) Oral daily  enoxaparin Injectable 40 milliGRAM(s) SubCutaneous every 24 hours  mometasone 220 MICROgram(s) Inhaler 1 Puff(s) Inhalation daily  sacubitril 97 mG/valsartan 103 mG 1 Tablet(s) Oral two times a day  spironolactone 12.5 milliGRAM(s) Oral daily    MEDICATIONS  (PRN):  acetaminophen     Tablet .. 650 milliGRAM(s) Oral every 6 hours PRN Mild Pain (1 - 3)  albuterol    90 MICROgram(s) HFA Inhaler 2 Puff(s) Inhalation every 6 hours PRN for shortness of breath and/or wheezing  benzonatate 100 milliGRAM(s) Oral three times a day PRN Cough      ALLERGIES: Allergies    No Known Allergies    Intolerances        OBJECTIVE:  ICU Vital Signs Last 24 Hrs  T(C): 36.8 (06 Jun 2023 05:00), Max: 36.8 (06 Jun 2023 05:00)  T(F): 98.2 (06 Jun 2023 05:00), Max: 98.2 (06 Jun 2023 05:00)  HR: 80 (06 Jun 2023 05:00) (62 - 80)  BP: 135/79 (06 Jun 2023 05:00) (131/57 - 170/72)  BP(mean): 86 (05 Jun 2023 17:35) (84 - 154)  ABP: --  ABP(mean): --  RR: 18 (06 Jun 2023 05:00) (18 - 18)  SpO2: 96% (06 Jun 2023 05:00) (95% - 100%)    O2 Parameters below as of 06 Jun 2023 05:00  Patient On (Oxygen Delivery Method): room air            Adult Advanced Hemodynamics Last 24 Hrs  CVP(mm Hg): --  CVP(cm H2O): --  CO: --  CI: --  PA: --  PA(mean): --  PCWP: --  SVR: --  SVRI: --  PVR: --  PVRI: --  CAPILLARY BLOOD GLUCOSE        CAPILLARY BLOOD GLUCOSE        I&O's Summary    04 Jun 2023 07:01  -  05 Jun 2023 07:00  --------------------------------------------------------  IN: 360 mL / OUT: 0 mL / NET: 360 mL    05 Jun 2023 07:01  -  06 Jun 2023 06:50  --------------------------------------------------------  IN: 360 mL / OUT: 800 mL / NET: -440 mL      Daily     Daily     PHYSICAL EXAMINATION:  General: NAD  Eyes: Sclera clear  Chest: Clear to auscultation bilaterally; no rales, rhonchi, or wheezing appreciated  Heart: Regular rate and rhythm; intact S1 and S2; no obvious murmurs, rubs, or gallops  Abd: Soft, nontender to palpation, nondistended  Nervous System: Alert and oriented  Ext: no peripheral LE edema bilaterally    LABS:                          12.7   4.81  )-----------( 211      ( 04 Jun 2023 07:20 )             39.6     06-05    136  |  103  |  32<H>  ----------------------------<  124<H>  4.7   |  22  |  0.65    Ca    9.9      05 Jun 2023 10:18  Phos  2.8     06-05  Mg     2.2     06-05        PT/INR - ( 04 Jun 2023 07:20 )   PT: 12.0 sec;   INR: 1.04 ratio         PTT - ( 04 Jun 2023 07:20 )  PTT:30.5 sec       PATIENT: CHRISTIANE OREILLY, MRN: [id number]    CHIEF COMPLAINT: Patient is a 72y old  Female who presents with a chief complaint of     INTERVAL HISTORY/OVERNIGHT EVENTS: No overnight events. Patient denies chest pain, shortness of breath, leg swelling. No palpitations or lightheadedness.     MEDICATIONS:  MEDICATIONS  (STANDING):  aspirin enteric coated 81 milliGRAM(s) Oral daily  atorvastatin 40 milliGRAM(s) Oral at bedtime  carvedilol 6.25 milliGRAM(s) Oral every 12 hours  clopidogrel Tablet 75 milliGRAM(s) Oral daily  enoxaparin Injectable 40 milliGRAM(s) SubCutaneous every 24 hours  mometasone 220 MICROgram(s) Inhaler 1 Puff(s) Inhalation daily  sacubitril 97 mG/valsartan 103 mG 1 Tablet(s) Oral two times a day  spironolactone 12.5 milliGRAM(s) Oral daily    MEDICATIONS  (PRN):  acetaminophen     Tablet .. 650 milliGRAM(s) Oral every 6 hours PRN Mild Pain (1 - 3)  albuterol    90 MICROgram(s) HFA Inhaler 2 Puff(s) Inhalation every 6 hours PRN for shortness of breath and/or wheezing  benzonatate 100 milliGRAM(s) Oral three times a day PRN Cough      ALLERGIES: Allergies    No Known Allergies    Intolerances        OBJECTIVE:  ICU Vital Signs Last 24 Hrs  T(C): 36.8 (06 Jun 2023 05:00), Max: 36.8 (06 Jun 2023 05:00)  T(F): 98.2 (06 Jun 2023 05:00), Max: 98.2 (06 Jun 2023 05:00)  HR: 80 (06 Jun 2023 05:00) (62 - 80)  BP: 135/79 (06 Jun 2023 05:00) (131/57 - 170/72)  BP(mean): 86 (05 Jun 2023 17:35) (84 - 154)  ABP: --  ABP(mean): --  RR: 18 (06 Jun 2023 05:00) (18 - 18)  SpO2: 96% (06 Jun 2023 05:00) (95% - 100%)    O2 Parameters below as of 06 Jun 2023 05:00  Patient On (Oxygen Delivery Method): room air            Adult Advanced Hemodynamics Last 24 Hrs  CVP(mm Hg): --  CVP(cm H2O): --  CO: --  CI: --  PA: --  PA(mean): --  PCWP: --  SVR: --  SVRI: --  PVR: --  PVRI: --  CAPILLARY BLOOD GLUCOSE        CAPILLARY BLOOD GLUCOSE        I&O's Summary    04 Jun 2023 07:01  -  05 Jun 2023 07:00  --------------------------------------------------------  IN: 360 mL / OUT: 0 mL / NET: 360 mL    05 Jun 2023 07:01  -  06 Jun 2023 06:50  --------------------------------------------------------  IN: 360 mL / OUT: 800 mL / NET: -440 mL      Daily     Daily     PHYSICAL EXAMINATION:  General: NAD  Eyes: Sclera clear  Chest: Clear to auscultation bilaterally; no rales, rhonchi, or wheezing appreciated  Heart: Regular rate and rhythm; intact S1 and S2; no obvious murmurs, rubs, or gallops  Abd: Soft, nontender to palpation, nondistended  Nervous System: Alert and oriented  Ext: no peripheral LE edema bilaterally    LABS:                          12.7   4.81  )-----------( 211      ( 04 Jun 2023 07:20 )             39.6     06-05    136  |  103  |  32<H>  ----------------------------<  124<H>  4.7   |  22  |  0.65    Ca    9.9      05 Jun 2023 10:18  Phos  2.8     06-05  Mg     2.2     06-05        PT/INR - ( 04 Jun 2023 07:20 )   PT: 12.0 sec;   INR: 1.04 ratio         PTT - ( 04 Jun 2023 07:20 )  PTT:30.5 sec       PATIENT: CHRISTIANE OREILLY, MRN: [id number]    CHIEF COMPLAINT: Patient is a 72y old  Female who presents with a chief complaint of     INTERVAL HISTORY/OVERNIGHT EVENTS: No overnight events. Patient denies chest pain, shortness of breath, leg swelling. No palpitations or lightheadedness.     MEDICATIONS:  MEDICATIONS  (STANDING):  aspirin enteric coated 81 milliGRAM(s) Oral daily  atorvastatin 40 milliGRAM(s) Oral at bedtime  carvedilol 6.25 milliGRAM(s) Oral every 12 hours  clopidogrel Tablet 75 milliGRAM(s) Oral daily  enoxaparin Injectable 40 milliGRAM(s) SubCutaneous every 24 hours  mometasone 220 MICROgram(s) Inhaler 1 Puff(s) Inhalation daily  sacubitril 97 mG/valsartan 103 mG 1 Tablet(s) Oral two times a day  spironolactone 12.5 milliGRAM(s) Oral daily    MEDICATIONS  (PRN):  acetaminophen     Tablet .. 650 milliGRAM(s) Oral every 6 hours PRN Mild Pain (1 - 3)  albuterol    90 MICROgram(s) HFA Inhaler 2 Puff(s) Inhalation every 6 hours PRN for shortness of breath and/or wheezing  benzonatate 100 milliGRAM(s) Oral three times a day PRN Cough      ALLERGIES: Allergies    No Known Allergies    Intolerances        OBJECTIVE:  ICU Vital Signs Last 24 Hrs  T(C): 36.8 (06 Jun 2023 05:00), Max: 36.8 (06 Jun 2023 05:00)  T(F): 98.2 (06 Jun 2023 05:00), Max: 98.2 (06 Jun 2023 05:00)  HR: 80 (06 Jun 2023 05:00) (62 - 80)  BP: 135/79 (06 Jun 2023 05:00) (131/57 - 170/72)  BP(mean): 86 (05 Jun 2023 17:35) (84 - 154)  ABP: --  ABP(mean): --  RR: 18 (06 Jun 2023 05:00) (18 - 18)  SpO2: 96% (06 Jun 2023 05:00) (95% - 100%)    O2 Parameters below as of 06 Jun 2023 05:00  Patient On (Oxygen Delivery Method): room air      I&O's Summary    04 Jun 2023 07:01 - 05 Jun 2023 07:00  --------------------------------------------------------  IN: 360 mL / OUT: 0 mL / NET: 360 mL    05 Jun 2023 07:01  -  06 Jun 2023 06:50  --------------------------------------------------------  IN: 360 mL / OUT: 800 mL / NET: -440 mL    PHYSICAL EXAMINATION:  General: NAD  Eyes: Sclera clear  Chest: Clear to auscultation bilaterally; no rales, rhonchi, or wheezing appreciated  Heart: Regular rate and rhythm; intact S1 and S2; no obvious murmurs, rubs, or gallops  Abd: Soft, nontender to palpation, nondistended  Nervous System: Alert and oriented  Ext: no peripheral LE edema bilaterally    LABS:                          12.7   4.81  )-----------( 211      ( 04 Jun 2023 07:20 )             39.6     06-05    136  |  103  |  32<H>  ----------------------------<  124<H>  4.7   |  22  |  0.65    Ca    9.9      05 Jun 2023 10:18  Phos  2.8     06-05  Mg     2.2     06-05        PT/INR - ( 04 Jun 2023 07:20 )   PT: 12.0 sec;   INR: 1.04 ratio         PTT - ( 04 Jun 2023 07:20 )  PTT:30.5 sec

## 2023-06-06 NOTE — PROGRESS NOTE ADULT - PROBLEM SELECTOR PLAN 3
- c/w albuterol   - c/w mometasone

## 2023-06-06 NOTE — PROGRESS NOTE ADULT - PROVIDER SPECIALTY LIST ADULT
Cardiology
Internal Medicine
Intervent Cardiology
Cardiology
Internal Medicine

## 2023-06-06 NOTE — PROGRESS NOTE ADULT - SUBJECTIVE AND OBJECTIVE BOX
Ezra Pichardo MD  Cardiology Fellow  [phone]  All Cardiology service information can be found 24/7 on [email or web address], password: [email or web address]  s/p LHC w/ VIANCA, fixed Lcx and OM1   HR 60s-80s, BP 130s-160s/70s    Review Of Systems: No chest pain, shortness of breath, or palpitations            Current Meds:  acetaminophen     Tablet .. 650 milliGRAM(s) Oral every 6 hours PRN  albuterol    90 MICROgram(s) HFA Inhaler 2 Puff(s) Inhalation every 6 hours PRN  aspirin enteric coated 81 milliGRAM(s) Oral daily  atorvastatin 40 milliGRAM(s) Oral at bedtime  benzonatate 100 milliGRAM(s) Oral three times a day PRN  carvedilol 6.25 milliGRAM(s) Oral every 12 hours  clopidogrel Tablet 75 milliGRAM(s) Oral daily  enoxaparin Injectable 40 milliGRAM(s) SubCutaneous every 24 hours  mometasone 220 MICROgram(s) Inhaler 1 Puff(s) Inhalation daily  sacubitril 97 mG/valsartan 103 mG 1 Tablet(s) Oral two times a day  spironolactone 12.5 milliGRAM(s) Oral daily\    Vitals:  T(F): 98.2 (06-06), Max: 98.2 (06-06)  HR: 80 (06-06) (62 - 80)  BP: 135/79 (06-06) (131/57 - 170/72)  RR: 18 (06-06)  SpO2: 96% (06-06)  I&O's Summary    04 Jun 2023 07:01 - 05 Jun 2023 07:00  --------------------------------------------------------  IN: 360 mL / OUT: 0 mL / NET: 360 mL    05 Jun 2023 07:01  -  06 Jun 2023 05:55  --------------------------------------------------------  IN: 360 mL / OUT: 800 mL / NET: -440 mL    Physical Exam:  Appearance: No acute distress; well appearing  Eyes: PERRL, EOMI, pink conjunctiva  HEENT: Normal oral mucosa  Cardiovascular: RRR, S1, S2, no murmurs, rubs, or gallops; no edema; no JVD  Respiratory: Clear to auscultation bilaterally  Gastrointestinal: soft, non-tender, non-distended with normal bowel sounds  Musculoskeletal: No clubbing; no joint deformity   Neurologic: Non-focal  Lymphatic: No lymphadenopathy  Psychiatry: AAOx3, mood & affect appropriate  Skin: No rashes, ecchymoses, or cyanosis                          12.7   4.81  )-----------( 211      ( 04 Jun 2023 07:20 )             39.6     06-05    136  |  103  |  32<H>  ----------------------------<  124<H>  4.7   |  22  |  0.65    Ca    9.9      05 Jun 2023 10:18  Phos  2.8     06-05  Mg     2.2     06-05      PT/INR - ( 04 Jun 2023 07:20 )   PT: 12.0 sec;   INR: 1.04 ratio         PTT - ( 04 Jun 2023 07:20 )  PTT:30.5 sec  CARDIAC MARKERS ( 04 Jun 2023 01:03 )  91 ng/L / x     / x     / x     / x     / x      CARDIAC MARKERS ( 03 Jun 2023 17:39 )  104 ng/L / x     / x     / x     / x     / x          TTE  CONCLUSIONS:   1. Left ventricular endocardium is not well visualized; however, the left ventricular systolic function appears moderately to severely reduced.   2. Mildly dilated left ventricular cavity size. There is global left ventricular hypokinesis.   3. The right ventricle is not well visualized.   4. Fibrocalcific aortic valve sclerosis without stenosis.   5. No pericardial effusion seen.    Cath 6/2  Coronary Angiography   The coronary circulation is co-dominant.      LM   Left main artery: Angiography shows minor irregularities.      LAD   Left anterior descending artery: Angiography shows minor  irregularities. Distal left anterior descending: Angiography shows  mild  atherosclerosis. splits into dual system. First diagonal: There is a  70 % stenosis.    CX   Distal circumflex: There is an 80 % stenosis. First obtuse marginal:  There is a 90 % stenosis.    RCA   Proximal right coronary artery: There is a 60 % stenosis.     Ezra Pichardo MD  Cardiology Fellow  [phone]  All Cardiology service information can be found 24/7 on [email or web address], password: [email or web address]  s/p LHC w/ VIANCA, fixed Lcx (VIANCA x 2) and OM1 (DESx2)  HR 60s-80s, BP 130s-160s/70s  Tele SR HR 60-80s with PVCs.  Daughter updated at bedside     Review Of Systems: No chest pain, shortness of breath, or palpitations            Current Meds:  acetaminophen     Tablet .. 650 milliGRAM(s) Oral every 6 hours PRN  albuterol    90 MICROgram(s) HFA Inhaler 2 Puff(s) Inhalation every 6 hours PRN  aspirin enteric coated 81 milliGRAM(s) Oral daily  atorvastatin 40 milliGRAM(s) Oral at bedtime  benzonatate 100 milliGRAM(s) Oral three times a day PRN  carvedilol 6.25 milliGRAM(s) Oral every 12 hours  clopidogrel Tablet 75 milliGRAM(s) Oral daily  enoxaparin Injectable 40 milliGRAM(s) SubCutaneous every 24 hours  mometasone 220 MICROgram(s) Inhaler 1 Puff(s) Inhalation daily  sacubitril 97 mG/valsartan 103 mG 1 Tablet(s) Oral two times a day  spironolactone 12.5 milliGRAM(s) Oral daily\    Vitals:  T(F): 98.2 (06-06), Max: 98.2 (06-06)  HR: 80 (06-06) (62 - 80)  BP: 135/79 (06-06) (131/57 - 170/72)  RR: 18 (06-06)  SpO2: 96% (06-06)  I&O's Summary    04 Jun 2023 07:01  -  05 Jun 2023 07:00  --------------------------------------------------------  IN: 360 mL / OUT: 0 mL / NET: 360 mL    05 Jun 2023 07:01  -  06 Jun 2023 05:55  --------------------------------------------------------  IN: 360 mL / OUT: 800 mL / NET: -440 mL    Physical Exam:  Appearance: No acute distress; well appearing  Eyes: PERRL, EOMI, pink conjunctiva  HEENT: Normal oral mucosa  Cardiovascular: RRR, S1, S2, no murmurs, rubs, or gallops; no edema; no JVD  Respiratory: Clear to auscultation bilaterally  Gastrointestinal: soft, non-tender, non-distended with normal bowel sounds  Musculoskeletal: No clubbing; no joint deformity   Neurologic: Non-focal  Lymphatic: No lymphadenopathy  Psychiatry: AAOx3, mood & affect appropriate  Skin: No rashes, ecchymoses, or cyanosis                          12.7   4.81  )-----------( 211      ( 04 Jun 2023 07:20 )             39.6     06-05    136  |  103  |  32<H>  ----------------------------<  124<H>  4.7   |  22  |  0.65    Ca    9.9      05 Jun 2023 10:18  Phos  2.8     06-05  Mg     2.2     06-05      PT/INR - ( 04 Jun 2023 07:20 )   PT: 12.0 sec;   INR: 1.04 ratio         PTT - ( 04 Jun 2023 07:20 )  PTT:30.5 sec  CARDIAC MARKERS ( 04 Jun 2023 01:03 )  91 ng/L / x     / x     / x     / x     / x      CARDIAC MARKERS ( 03 Jun 2023 17:39 )  104 ng/L / x     / x     / x     / x     / x          TTE  CONCLUSIONS:   1. Left ventricular endocardium is not well visualized; however, the left ventricular systolic function appears moderately to severely reduced.   2. Mildly dilated left ventricular cavity size. There is global left ventricular hypokinesis.   3. The right ventricle is not well visualized.   4. Fibrocalcific aortic valve sclerosis without stenosis.   5. No pericardial effusion seen.    Cath 6/2  Coronary Angiography   The coronary circulation is co-dominant.      LM   Left main artery: Angiography shows minor irregularities.      LAD   Left anterior descending artery: Angiography shows minor  irregularities. Distal left anterior descending: Angiography shows  mild  atherosclerosis. splits into dual system. First diagonal: There is a  70 % stenosis.    CX   Distal circumflex: There is an 80 % stenosis. First obtuse marginal:  There is a 90 % stenosis.    RCA   Proximal right coronary artery: There is a 60 % stenosis.

## 2023-06-06 NOTE — PROGRESS NOTE ADULT - PROBLEM SELECTOR PLAN 2
Does not appear grossly volume overloaded at this time   TTE 6/2 with EF 38% with global hypokinesis (multivessel disease)   Cardiology recs appreciated  - continue entresto   - c/w coreg, well tolerated  - continue with spironolactone 12.5mg   - consider lasix if overloaded

## 2023-06-06 NOTE — PROGRESS NOTE ADULT - ASSESSMENT
72 year old female h/o HTN (only med at home nifepidine and Albuterol), asthma (no intubations) presents with new HFrEF in setting of positive stress test. [facility] with obstructive CAD, s/p PCI 6/5 now on GDMT.

## 2023-06-06 NOTE — DISCHARGE NOTE PROVIDER - NSFOLLOWUPCLINICS_GEN_ALL_ED_FT
[facility] [place]  Internal Medicine  [address] [place] [address]  Phone: [phone]  Fax: [phone]  Follow Up Time: 1 week

## 2023-06-06 NOTE — DISCHARGE NOTE PROVIDER - CARE PROVIDER_API CALL
Anisha Chester  Interventional Cardiology  [address] [place] [address], [address] [place] [id number]  Phone: [phone]  Fax: [phone]  Follow Up Time: 1 week

## 2023-06-06 NOTE — PROGRESS NOTE ADULT - ASSESSMENT
71 yo F with hx of HTN, asthma presented to [facility] with new onset ADHF and found to have +stress test, transferred to [facility] for LHC, which showed 70% D1, 80% dCx, 90% OM1, pRCA 60%, s/p PCI/VIANCA 6/5    #ADHF  - TTE showed LVEF 38% with poor wall visualization, likely global. Formal cath report including LVEDP is pending upload  - c/w coreg 6.25 BID  - c/w Entresto 97/103 BID   - Primary team decreased spironolactone dosing to 12.5mg (from 25) in light of hyperkalemia x2 days to 5.4-5.5   - eventual SGLT2i outpatient    #CAD  - c/w asa and statin as written   - hstrop peak at 104 over the weekend. Currently CP free  - s/p PCI/VIANCA intervention, intervened on Cx and OM1. Formal cath report including LVEDP is pending upload     71 yo F with hx of HTN, asthma presented to [facility] with new onset ADHF and found to have +stress test, transferred to [facility] for LHC, which showed 70% D1, 80% dCx, 90% OM1, pRCA 60%, s/p PCI/VIANCA 6/5    #ADHF  - TTE showed LVEF 38% with poor wall visualization, likely global. Formal cath report including LVEDP is pending upload  - c/w coreg 6.25 BID  - c/w Entresto 97/103 BID   - Primary team decreased spironolactone dosing to 12.5mg (from 25) in light of hyperkalemia x2 days to 5.4-5.5. Appropriate past couple of days, can continue   - LVEDP 10 in cath lab, defer maintenance diuretics  - eventual SGLT2i outpatient    #CAD  - c/w asa and statin as written   - hstrop peak at 104 over the weekend. Currently CP free  - s/p PCI/VIANCA intervention, intervened on Cx and OM1 (VIANCA x2 apiece).

## 2023-06-06 NOTE — PROGRESS NOTE ADULT - PROBLEM SELECTOR PLAN 1
Transferred from [facility] with shortness of breath with effusions, found to have acute HFrEF (report not in chart)   Underwent LHC 6/2 with 90% prox diagonal and 80% OM   S/p dCX stent x 2 (80%), s/p OM stent x 2 (90%) on 6/5   - c/w coreg  - c/w aspirin, plavix and atorvastatin   - c/w tele Transferred from [facility] with shortness of breath with effusions, found to have acute HFrEF (report not in chart)   Underwent LHC 6/2 with 90% prox diagonal and 80% OM   S/p dCX VIANCA x 2 (80%), s/p OM VIANCA x 2 (90%) on 6/5   - c/w coreg  - c/w aspirin, plavix and atorvastatin   - c/w tele

## 2023-06-07 PROBLEM — I50.20 UNSPECIFIED SYSTOLIC (CONGESTIVE) HEART FAILURE: Chronic | Status: ACTIVE | Noted: 2023-06-02

## 2023-06-07 PROBLEM — I10 ESSENTIAL (PRIMARY) HYPERTENSION: Chronic | Status: ACTIVE | Noted: 2023-06-02

## 2023-06-07 PROBLEM — J45.909 UNSPECIFIED ASTHMA, UNCOMPLICATED: Chronic | Status: ACTIVE | Noted: 2023-06-02

## 2023-06-13 ENCOUNTER — OUTPATIENT (OUTPATIENT)
Dept: OUTPATIENT SERVICES | Facility: HOSPITAL | Age: 73
LOS: 1 days | End: 2023-06-13

## 2023-06-13 ENCOUNTER — APPOINTMENT (OUTPATIENT)
Dept: INTERNAL MEDICINE | Facility: CLINIC | Age: 73
End: 2023-06-13
Payer: COMMERCIAL

## 2023-06-13 VITALS
WEIGHT: 137 LBS | OXYGEN SATURATION: 97 % | HEIGHT: 60.24 IN | SYSTOLIC BLOOD PRESSURE: 150 MMHG | HEART RATE: 77 BPM | BODY MASS INDEX: 26.55 KG/M2 | DIASTOLIC BLOOD PRESSURE: 80 MMHG

## 2023-06-13 LAB
ANION GAP SERPL CALC-SCNC: 10 MMOL/L
BUN SERPL-MCNC: 25 MG/DL
CALCIUM SERPL-MCNC: 10.3 MG/DL
CHLORIDE SERPL-SCNC: 102 MMOL/L
CO2 SERPL-SCNC: 26 MMOL/L
CREAT SERPL-MCNC: 0.75 MG/DL
EGFR: 85 ML/MIN/1.73M2
GLUCOSE SERPL-MCNC: 102 MG/DL
NT-PROBNP SERPL-MCNC: 282 PG/ML
POTASSIUM SERPL-SCNC: 5.1 MMOL/L
SODIUM SERPL-SCNC: 138 MMOL/L

## 2023-06-13 PROCEDURE — ZZZZZ: CPT | Mod: GC

## 2023-06-13 NOTE — PHYSICAL EXAM
[No Acute Distress] : no acute distress [Well Nourished] : well nourished [No JVD] : no jugular venous distention [No Respiratory Distress] : no respiratory distress  [No Accessory Muscle Use] : no accessory muscle use [Regular Rhythm] : with a regular rhythm [Normal S1, S2] : normal S1 and S2 [No Edema] : there was no peripheral edema [Soft] : abdomen soft [Non Tender] : non-tender [No CVA Tenderness] : no CVA  tenderness [Alert and Oriented x3] : oriented to person, place, and time

## 2023-06-13 NOTE — REVIEW OF SYSTEMS
[Cough] : cough [Fever] : no fever [Chills] : no chills [Vision Problems] : no vision problems [Sore Throat] : no sore throat [Chest Pain] : no chest pain [Lower Ext Edema] : no lower extremity edema [Abdominal Pain] : no abdominal pain [Shortness Of Breath] : no shortness of breath [Vomiting] : no vomiting [Dysuria] : no dysuria [Hematuria] : no hematuria [Headache] : no headache [Confusion] : no confusion

## 2023-06-13 NOTE — PLAN
[FreeTextEntry1] : \par 72 year old female h/o HTN (used to be on diltiazem and nifedipine, now offl), asthma (no intubations), cervical cancer s/p radiation (many years ago), HFrEF, CAD s/p PCI to DCX (80%) x2 and OM1 (90%)x2 6/2023, presents for HFU following hospitalization for HFrEF and multivessel CAD 6/2-6/6/23 at [facility] \par \par #URI \par symptoms of URI including congestion, cough, sputum production\par No fevers\par Asthma management as below, cough suppressant as needed\par Instructed to f/u if symptoms don't resolve \par \par #HFrEF\par Likely Ischemic in origin: CAD s/p PCI to DCX (80%) x2 and OM1 (90%)x2 6/2023\par NYHA Class I symptoms at this time\par Echo in 6/23: EF 38%, moderate-severe global LVSDF\par Educated on monitoring weights and informing PCP of any symptoms\par Currently off diuretics\par - GDMT with Coreg, Aldactone (lower dose due to hyperkalemia in-hospital), Entresto ([facility] following for insurance approval)\par - will consider SGLT2i at future visit\par - Cardiology referral given\par \par #CAD\par CAD s/p PCI to DCX (80%) x2 and OM1 (90%)x2 6/2023\par Currently on aspirin, plavix\par has interventional Cards fu \par \par #HTN\par Hx of HTN on nifedipine/diltiazem \par - off HTN meds in setting of GDMT \par - HTN at this visit, will recheck BP and consider uptitration of GDMT if needed at future visit which will also help with BP control \par \par #HLD\par - HLD, on lipitor 40mg\par - will recheck lipid profile\par \par #Cervical Cancer\par - reports hx of cervical cancer s/p radiation many years ago in [place]\par - gyn referral for pap testing and further workup \par \par #Asthma \par - Asthma well controlled\par - albuterol PRN\par \par #HCM\par UTD on COVID\par - will consider Prevnar, Tdap at future visits\par - given DEXA, mammography referral \par - A1c, lipid profile ordered \par \par CDW Dr. Kong \par RTC in 1 month or sooner if symptoms develop \par

## 2023-06-13 NOTE — HISTORY OF PRESENT ILLNESS
[FreeTextEntry1] : U [de-identified] : 72 year old female h/o HTN (used to be on diltiazem and nifedipine, now offl), asthma (no intubations), cervical cancer s/p radiation (many years ago), HFrEF, CAD s/p PCI to DCX (80%) x2 and OM1 (90%)x2 6/2023, presents for HFU following hospitalization for HFrEF and multivessel CAD 6/2-6/6/23 at [facility] (Transferred from [facility] 5/30-6/3)\par \par She originally presented to [facility] with sob, cough that was not responding to nebulizer treatments which prompted her workup that showed multivessel CAD and HfrEF. \par \par She reports today having a bad cough since 6/7. Pt states she is coughing up yellow sputum. Pt denies rhinorrhea, but has congestion. Pt denies sore throat, chills, fevers. \par \par She denies sob or wheezing even with ambulation. pt also denies cp with ambulation. Pt states she uses 2 pillows to sleep which is her usual but can lay flat without a problem. Pt  will order a weight scale. \par \par She reports getting COVID vaccinated and boosted. \par \par Social Hx: She lives with daughter. She denies alcohol, tobacco, or recreational drugs.

## 2023-06-13 NOTE — END OF VISIT
[] : Resident [FreeTextEntry3] : Here as new patient HFU.\par 6/2-6/6 admitted for newly dx'ed HF (EF 38%). Transferred from [facility] for PCI.\par Had stent placed.\par +cough with some sputum production.\par No sob, chest pain.\par Euvolemic on exam today.

## 2023-06-14 DIAGNOSIS — I50.9 HEART FAILURE, UNSPECIFIED: ICD-10-CM

## 2023-06-14 DIAGNOSIS — I25.10 ATHEROSCLEROTIC HEART DISEASE OF NATIVE CORONARY ARTERY WITHOUT ANGINA PECTORIS: ICD-10-CM

## 2023-06-14 LAB
CHOLEST SERPL-MCNC: 170 MG/DL
ESTIMATED AVERAGE GLUCOSE: 151 MG/DL
HBA1C MFR BLD HPLC: 6.9 %
HDLC SERPL-MCNC: 60 MG/DL
LDLC SERPL CALC-MCNC: 83 MG/DL
NONHDLC SERPL-MCNC: 110 MG/DL
TRIGL SERPL-MCNC: 136 MG/DL

## 2023-06-14 RX ORDER — FLUTICASONE PROPIONATE 50 UG/1
50 POWDER, METERED RESPIRATORY (INHALATION) TWICE DAILY
Qty: 5 | Refills: 0 | Status: ACTIVE | COMMUNITY
Start: 2023-06-13 | End: 1900-01-01

## 2023-06-14 RX ORDER — BENZONATATE 100 MG/1
100 CAPSULE ORAL 3 TIMES DAILY
Qty: 90 | Refills: 0 | Status: ACTIVE | COMMUNITY
Start: 2023-06-13 | End: 1900-01-01

## 2023-06-21 ENCOUNTER — NON-APPOINTMENT (OUTPATIENT)
Age: 73
End: 2023-06-21

## 2023-06-21 ENCOUNTER — OUTPATIENT (OUTPATIENT)
Dept: OUTPATIENT SERVICES | Facility: HOSPITAL | Age: 73
LOS: 1 days | End: 2023-06-21
Payer: SELF-PAY

## 2023-06-21 ENCOUNTER — APPOINTMENT (OUTPATIENT)
Dept: CARDIOLOGY | Facility: HOSPITAL | Age: 73
End: 2023-06-21

## 2023-06-21 VITALS — DIASTOLIC BLOOD PRESSURE: 70 MMHG | SYSTOLIC BLOOD PRESSURE: 155 MMHG

## 2023-06-21 VITALS
DIASTOLIC BLOOD PRESSURE: 80 MMHG | SYSTOLIC BLOOD PRESSURE: 172 MMHG | HEART RATE: 59 BPM | BODY MASS INDEX: 26.55 KG/M2 | OXYGEN SATURATION: 98 % | WEIGHT: 137 LBS | HEIGHT: 60.24 IN

## 2023-06-21 DIAGNOSIS — I25.10 ATHEROSCLEROTIC HEART DISEASE OF NATIVE CORONARY ARTERY WITHOUT ANGINA PECTORIS: ICD-10-CM

## 2023-06-21 PROCEDURE — G0463: CPT

## 2023-06-21 PROCEDURE — 93005 ELECTROCARDIOGRAM TRACING: CPT

## 2023-06-21 NOTE — REVIEW OF SYSTEMS
[Fever] : no fever [Chills] : no chills [SOB] : no shortness of breath [Dyspnea on exertion] : not dyspnea during exertion [Chest Discomfort] : no chest discomfort [Lower Ext Edema] : no extremity edema [Palpitations] : no palpitations [Orthopnea] : no orthopnea [PND] : no PND [Cough] : cough

## 2023-06-21 NOTE — PHYSICAL EXAM
[No Acute Distress] : no acute distress [Normal Venous Pressure] : normal venous pressure [Normal S1, S2] : normal S1, S2 [No Murmur] : no murmur [Clear Lung Fields] : clear lung fields [Good Air Entry] : good air entry [Soft] : abdomen soft [Non Tender] : non-tender [Normal Gait] : normal gait [No Edema] : no edema [Moves all extremities] : moves all extremities [No Focal Deficits] : no focal deficits [Alert and Oriented] : alert and oriented

## 2023-06-21 NOTE — CARDIOLOGY SUMMARY
[de-identified] : TTE 6/2023:\par CONCLUSIONS:\par  \par  1. Left ventricular endocardium is not well visualized; however, the left ventricular systolic function appears moderately to severely reduced. (LVIDd 5.2 cm)\par  2. Mildly dilated left ventricular cavity size. There is global left ventricular hypokinesis.\par  3. The right ventricle is not well visualized.\par  4. Fibrocalcific aortic valve sclerosis without stenosis.\par  5. No pericardial effusion seen. [de-identified] : 6/2023: \par PCI to OM1 x 2 and LCx x 2

## 2023-06-30 ENCOUNTER — APPOINTMENT (OUTPATIENT)
Dept: CARDIOLOGY | Facility: CLINIC | Age: 73
End: 2023-06-30

## 2023-06-30 ENCOUNTER — OUTPATIENT (OUTPATIENT)
Dept: OUTPATIENT SERVICES | Facility: HOSPITAL | Age: 73
LOS: 1 days | End: 2023-06-30
Payer: SELF-PAY

## 2023-06-30 ENCOUNTER — APPOINTMENT (OUTPATIENT)
Dept: RADIOLOGY | Facility: CLINIC | Age: 73
End: 2023-06-30
Payer: COMMERCIAL

## 2023-06-30 ENCOUNTER — APPOINTMENT (OUTPATIENT)
Dept: MAMMOGRAPHY | Facility: CLINIC | Age: 73
End: 2023-06-30
Payer: COMMERCIAL

## 2023-06-30 DIAGNOSIS — Z00.00 ENCOUNTER FOR GENERAL ADULT MEDICAL EXAMINATION WITHOUT ABNORMAL FINDINGS: ICD-10-CM

## 2023-06-30 PROCEDURE — 77086 VRT FRACTURE ASSMT VIA DXA: CPT

## 2023-06-30 PROCEDURE — 77067 SCR MAMMO BI INCL CAD: CPT | Mod: 26

## 2023-06-30 PROCEDURE — 77063 BREAST TOMOSYNTHESIS BI: CPT | Mod: 26

## 2023-06-30 PROCEDURE — 77086 VRT FRACTURE ASSMT VIA DXA: CPT | Mod: 26

## 2023-06-30 PROCEDURE — 77063 BREAST TOMOSYNTHESIS BI: CPT

## 2023-06-30 PROCEDURE — 77067 SCR MAMMO BI INCL CAD: CPT

## 2023-07-07 RX ORDER — VALSARTAN 40 MG/1
40 TABLET, COATED ORAL TWICE DAILY
Qty: 70 | Refills: 0 | Status: DISCONTINUED | COMMUNITY
Start: 2023-07-07 | End: 2023-07-07

## 2023-07-20 PROCEDURE — C1894: CPT

## 2023-07-20 PROCEDURE — 86850 RBC ANTIBODY SCREEN: CPT

## 2023-07-20 PROCEDURE — 93458 L HRT ARTERY/VENTRICLE ANGIO: CPT

## 2023-07-20 PROCEDURE — C1874: CPT

## 2023-07-20 PROCEDURE — 94640 AIRWAY INHALATION TREATMENT: CPT

## 2023-07-20 PROCEDURE — 86901 BLOOD TYPING SEROLOGIC RH(D): CPT

## 2023-07-20 PROCEDURE — 84100 ASSAY OF PHOSPHORUS: CPT

## 2023-07-20 PROCEDURE — 93306 TTE W/DOPPLER COMPLETE: CPT

## 2023-07-20 PROCEDURE — 80053 COMPREHEN METABOLIC PANEL: CPT

## 2023-07-20 PROCEDURE — C9601: CPT | Mod: LC

## 2023-07-20 PROCEDURE — 85027 COMPLETE CBC AUTOMATED: CPT

## 2023-07-20 PROCEDURE — 84484 ASSAY OF TROPONIN QUANT: CPT

## 2023-07-20 PROCEDURE — 85730 THROMBOPLASTIN TIME PARTIAL: CPT

## 2023-07-20 PROCEDURE — C1725: CPT

## 2023-07-20 PROCEDURE — 80048 BASIC METABOLIC PNL TOTAL CA: CPT

## 2023-07-20 PROCEDURE — 85610 PROTHROMBIN TIME: CPT

## 2023-07-20 PROCEDURE — 86900 BLOOD TYPING SEROLOGIC ABO: CPT

## 2023-07-20 PROCEDURE — 93005 ELECTROCARDIOGRAM TRACING: CPT

## 2023-07-20 PROCEDURE — 83735 ASSAY OF MAGNESIUM: CPT

## 2023-07-20 PROCEDURE — C1887: CPT

## 2023-07-20 PROCEDURE — C9600: CPT | Mod: LC

## 2023-07-20 PROCEDURE — C1769: CPT

## 2023-07-20 PROCEDURE — 36415 COLL VENOUS BLD VENIPUNCTURE: CPT

## 2023-07-20 PROCEDURE — 86803 HEPATITIS C AB TEST: CPT

## 2023-07-21 ENCOUNTER — APPOINTMENT (OUTPATIENT)
Dept: INTERNAL MEDICINE | Facility: CLINIC | Age: 73
End: 2023-07-21
Payer: COMMERCIAL

## 2023-07-21 ENCOUNTER — OUTPATIENT (OUTPATIENT)
Dept: OUTPATIENT SERVICES | Facility: HOSPITAL | Age: 73
LOS: 1 days | End: 2023-07-21

## 2023-07-21 VITALS
OXYGEN SATURATION: 97 % | WEIGHT: 138.38 LBS | BODY MASS INDEX: 26.81 KG/M2 | HEART RATE: 64 BPM | HEIGHT: 60.24 IN | DIASTOLIC BLOOD PRESSURE: 80 MMHG | SYSTOLIC BLOOD PRESSURE: 160 MMHG

## 2023-07-21 PROCEDURE — ZZZZZ: CPT | Mod: GE

## 2023-07-21 RX ORDER — VALSARTAN 160 MG/1
160 TABLET, COATED ORAL TWICE DAILY
Qty: 28 | Refills: 2 | Status: DISCONTINUED | COMMUNITY
Start: 2023-07-07 | End: 2023-07-21

## 2023-07-21 NOTE — PLAN
[FreeTextEntry1] : \par 72 year old female h/o HTN (used to be on diltiazem and nifedipine, now offl), asthma (no intubations), cervical cancer s/p radiation (many years ago), HFrEF, CAD s/p PCI to DCX (80%) x2 and OM1 (90%)x2 6/2023, hospitalization for HFrEF and multivessel CAD 6/2-6/6/23 at [facility] (Transferred from [facility] 5/30-6/3) here for follow up. \par \par #HFrEF\par Likely Ischemic in origin: CAD s/p PCI to DCX (80%) x2 and OM1 (90%)x2 6/2023\par NYHA Class I symptoms at this time\par Echo in 6/23: EF 38%, moderate-severe global LVSDF; next due for Echo 9/23 per cards\par Educated on monitoring weights and informing PCP of any symptoms\par Currently off diuretics\par - GDMT with Coreg---> increased to 12.5BID given HTN, Aldactone (lower dose due to hyperkalemia in-hospital), Entresto ([facility] following for insurance approval)\par - SGLT2i pending ([facility] working on insurance approval)\par - following cardiology\par \par #Diabetes\par - A1c 6.9 \par - working on authorization for SGLT2i\par \par #Osteoporosis\par - DEXA 6/23 showing Osteoporosis, edilberto of hip and fem neck\par - Vit D and Calcium level sent\par - consider Vit D & Calcium supplementation and Alendronate 70mg PO weekly\par \par #CAD\par CAD s/p PCI to DCX (80%) x2 and OM1 (90%)x2 6/2023\par Currently on aspirin, plavix\par has interventional Cards fu \par \par #HTN\par Hx of HTN on nifedipine/diltiazem \par - off HTN meds in setting of GDMT \par - HTN at this visit, will recheck BP and consider uptitration of GDMT if needed at future visit which will also help with BP control \par - coreg increased 7/21\par \par #HLD\par - HLD, lipitor-->80 mg \par \par #Cervical Cancer\par - reports hx of cervical cancer s/p radiation many years ago in [place]\par - gyn referral for pap testing and further workup\par - states she has gyn follow up in 08/23\par \par #Asthma \par - Asthma well controlled\par - albuterol PRN\par \par #HCM\par UTD on COVID\par - Prevnar 7/21, Tdap will consider\par - mammography referral \par - FIT DNA sent\par \par CDW Dr. Elam\par RTC in 1 month

## 2023-07-21 NOTE — HISTORY OF PRESENT ILLNESS
[FreeTextEntry1] : Follow Up [de-identified] : 72 year old female h/o HTN (used to be on diltiazem and nifedipine, now offl), asthma (no intubations), cervical cancer s/p radiation (many years ago), HFrEF, CAD s/p PCI to DCX (80%) x2 and OM1 (90%)x2 6/2023, hospitalization for HFrEF and multivessel CAD 6/2-6/6/23 at [facility] (Transferred from [facility] 5/30-6/3) here for follow up. \par \par Pt denies sob even with lots of walking around the city. Pt denies cp, leg swelling. Pt reports dry weight of 138, which is consistent with today's weight.

## 2023-07-21 NOTE — REVIEW OF SYSTEMS
[Fever] : no fever [Chills] : no chills [Vision Problems] : no vision problems [Sore Throat] : no sore throat [Chest Pain] : no chest pain [Lower Ext Edema] : no lower extremity edema [Shortness Of Breath] : no shortness of breath [Abdominal Pain] : no abdominal pain [Dysuria] : no dysuria [Vomiting] : no vomiting [Hematuria] : no hematuria

## 2023-07-21 NOTE — PHYSICAL EXAM
[No Acute Distress] : no acute distress [Well Nourished] : well nourished [EOMI] : extraocular movements intact [Normal TMs] : both tympanic membranes were normal [Supple] : supple [No Respiratory Distress] : no respiratory distress  [Normal S1, S2] : normal S1 and S2 [Soft] : abdomen soft [Non-distended] : non-distended [No CVA Tenderness] : no CVA  tenderness [No Focal Deficits] : no focal deficits [Alert and Oriented x3] : oriented to person, place, and time

## 2023-07-22 LAB
25(OH)D3 SERPL-MCNC: 18 NG/ML
ALBUMIN SERPL ELPH-MCNC: 4.6 G/DL
ALP BLD-CCNC: 91 U/L
ALT SERPL-CCNC: 27 U/L
ANION GAP SERPL CALC-SCNC: 12 MMOL/L
AST SERPL-CCNC: 29 U/L
BILIRUB SERPL-MCNC: 0.5 MG/DL
BUN SERPL-MCNC: 25 MG/DL
CALCIUM SERPL-MCNC: 10.4 MG/DL
CHLORIDE SERPL-SCNC: 104 MMOL/L
CO2 SERPL-SCNC: 24 MMOL/L
CREAT SERPL-MCNC: 0.74 MG/DL
EGFR: 86 ML/MIN/1.73M2
GLUCOSE SERPL-MCNC: 140 MG/DL
POTASSIUM SERPL-SCNC: 4.4 MMOL/L
PROT SERPL-MCNC: 8.1 G/DL
SODIUM SERPL-SCNC: 140 MMOL/L

## 2023-07-24 DIAGNOSIS — I50.9 HEART FAILURE, UNSPECIFIED: ICD-10-CM

## 2023-07-24 DIAGNOSIS — I25.10 ATHEROSCLEROTIC HEART DISEASE OF NATIVE CORONARY ARTERY WITHOUT ANGINA PECTORIS: ICD-10-CM

## 2023-07-24 DIAGNOSIS — E78.5 HYPERLIPIDEMIA, UNSPECIFIED: ICD-10-CM

## 2023-07-24 DIAGNOSIS — I10 ESSENTIAL (PRIMARY) HYPERTENSION: ICD-10-CM

## 2023-08-01 ENCOUNTER — NON-APPOINTMENT (OUTPATIENT)
Age: 73
End: 2023-08-01

## 2023-08-15 ENCOUNTER — APPOINTMENT (OUTPATIENT)
Dept: OBGYN | Facility: HOSPITAL | Age: 73
End: 2023-08-15

## 2023-08-21 ENCOUNTER — OUTPATIENT (OUTPATIENT)
Dept: OUTPATIENT SERVICES | Facility: HOSPITAL | Age: 73
LOS: 1 days | End: 2023-08-21

## 2023-08-21 ENCOUNTER — APPOINTMENT (OUTPATIENT)
Dept: INTERNAL MEDICINE | Facility: CLINIC | Age: 73
End: 2023-08-21
Payer: COMMERCIAL

## 2023-08-21 VITALS
SYSTOLIC BLOOD PRESSURE: 150 MMHG | DIASTOLIC BLOOD PRESSURE: 80 MMHG | BODY MASS INDEX: 24.65 KG/M2 | HEIGHT: 63 IN | WEIGHT: 139.13 LBS | OXYGEN SATURATION: 98 % | HEART RATE: 63 BPM

## 2023-08-21 DIAGNOSIS — Z00.00 ENCOUNTER FOR GENERAL ADULT MEDICAL EXAMINATION W/OUT ABNORMAL FINDINGS: ICD-10-CM

## 2023-08-21 PROCEDURE — ZZZZZ: CPT

## 2023-08-21 RX ORDER — MULTIVIT-MIN/IRON/FOLIC ACID/K 18-600-40
50 MCG CAPSULE ORAL DAILY
Qty: 30 | Refills: 1 | Status: ACTIVE | COMMUNITY
Start: 2023-08-21 | End: 1900-01-01

## 2023-08-21 RX ORDER — CALCIUM CITRATE 200(950)MG
950 (200 CA) TABLET ORAL DAILY
Qty: 30 | Refills: 0 | Status: ACTIVE | COMMUNITY
Start: 2023-08-21 | End: 1900-01-01

## 2023-08-21 NOTE — END OF VISIT
[] : Resident [FreeTextEntry3] : Pt is a 73-year-old F w/ PMHx HTN, Type 2 DM, asthma, cervical cancer s/p radiation, and newly diagnosed HFrEF (ejection fracture 38% on 6/23) d/t multivessel CAD s/p PCI, who presents for follow-up of HTN. Pt euvolemic on exam off of diuretics. BP elevated to 150/80 mmHg today; restarting higher coreg dose (from 6.25 mg BID to 12.5 mg BID) as above and will recheck BP via telehealth visit in 2 weeks (if still elevated at that time can increase Coreg to 25 mg BID). Otherwise, pt is on currently GDMT for HFrEF with Entresto max dose, spironolactone 12.5 mg daily, carvedilol 12.5 mg BID, and empagliflozin 10 mg daily. Taking aspirin, plavix, and high dose atorvastatin 80mg as prescribed for CAD. No respiratory issues with current asthma inhaler therapy. Has Cardiology follow up in 1 month. Televisit to check BP in 2 weeks and regular FUV 2 months (at that time can recheck A1c at that time to evaluate while on empaglifozin - was 6.9% 6/2023). Otherwise upon review of hospital labs, vit D was low at 18, will send vit D supplement to pharmacy. Can discuss starting bisphosphate at next visit given osteoporosis diagnosis, in interim will send calcium supplement. Agree with rest of assessment and plan as per Dr. Woo's note above.   At next in office visit: -Monitor BP -Check A1c, urine protein:cr  -Discuss bisphosphonate vs calcium/vit D  -F/u fit test, PCV20, Tdap -Ensure pap smear performed (scheduled)

## 2023-08-21 NOTE — PHYSICAL EXAM
[No Acute Distress] : no acute distress [Well-Appearing] : well-appearing [Normal Sclera/Conjunctiva] : normal sclera/conjunctiva [EOMI] : extraocular movements intact [No JVD] : no jugular venous distention [No Lymphadenopathy] : no lymphadenopathy [Supple] : supple [No Respiratory Distress] : no respiratory distress  [Clear to Auscultation] : lungs were clear to auscultation bilaterally [Normal Rate] : normal rate  [Regular Rhythm] : with a regular rhythm [No Murmur] : no murmur heard [Pedal Pulses Present] : the pedal pulses are present [No Edema] : there was no peripheral edema [No Extremity Clubbing/Cyanosis] : no extremity clubbing/cyanosis [No Joint Swelling] : no joint swelling [Grossly Normal Strength/Tone] : grossly normal strength/tone [No Rash] : no rash [Coordination Grossly Intact] : coordination grossly intact [No Focal Deficits] : no focal deficits [Normal Gait] : normal gait [Speech Grossly Normal] : speech grossly normal [de-identified] : Appears euvolemic on exam.

## 2023-08-21 NOTE — ASSESSMENT
[FreeTextEntry1] : #healthcare maintenance  - vaccinations at next visit: Prevnar 20, Tdap - FIT DNA kit provided - mammogram up to date (2023) - scheduled for pap smear   RTC in 2 weeks for telehealth visit for BP check + review of BP journal RTC in 2 months for regular follow-up on chronic conditions  Case Discussed w/ Dr. Jerel Woo MD PGY 1 Psychiatry Medical Specialties at [facility]

## 2023-08-21 NOTE — HISTORY OF PRESENT ILLNESS
[FreeTextEntry1] : BP check  [de-identified] : Priyanka Mcnamara is 73 year old woman w/ newly diagnosed heart failure with reduced ejection fraction (ejection fracture 38% on 6/23) d/t multivessel CAD s/p PCI, hypertension, and type 2 diabetes who presents for follow-up on hypertension.  Seen by PCP Dr. Orona on 7/21 and noted to be hypertensive to 160/80, carvedilol dose increased to 12.5 twice daily. No changes made to other antihypertensives which include Entresto (97 mg-103 mg BID) and spironolactone (12.5mg QD). Today, patient returns and BP is still elevated to 150/80. Reports checking BP regularly at home, which runs 120-130s SBP and 70-80s DBP. Denies seeing numbers above 150. Denies headache, dizziness, changes in vision, chest pain, or SOB.   Upon further investigation, revealed that pt never received increased dose of carvedilol and instead had continued taking 6.25mg BID. Urged patient to take the right dose. Discussed the benefit of keeping BP journal over the next two weeks to check trend.

## 2023-08-21 NOTE — REVIEW OF SYSTEMS
[Fever] : no fever [Chills] : no chills [Pain] : no pain [Vision Problems] : no vision problems [Nasal Discharge] : no nasal discharge [Sore Throat] : no sore throat [Chest Pain] : no chest pain [Palpitations] : no palpitations [Lower Ext Edema] : no lower extremity edema [Shortness Of Breath] : no shortness of breath [Wheezing] : no wheezing [Dyspnea on Exertion] : no dyspnea on exertion [Abdominal Pain] : no abdominal pain [Nausea] : no nausea [Dysuria] : no dysuria [Joint Pain] : no joint pain [Muscle Pain] : no muscle pain [Headache] : no headache [Dizziness] : no dizziness [Fainting] : no fainting [Confusion] : no confusion [FreeTextEntry9] : + frozen shoulder

## 2023-08-22 DIAGNOSIS — I10 ESSENTIAL (PRIMARY) HYPERTENSION: ICD-10-CM

## 2023-08-22 DIAGNOSIS — I50.20 UNSPECIFIED SYSTOLIC (CONGESTIVE) HEART FAILURE: ICD-10-CM

## 2023-08-22 DIAGNOSIS — Z00.00 ENCOUNTER FOR GENERAL ADULT MEDICAL EXAMINATION WITHOUT ABNORMAL FINDINGS: ICD-10-CM

## 2023-09-05 ENCOUNTER — OUTPATIENT (OUTPATIENT)
Dept: OUTPATIENT SERVICES | Facility: HOSPITAL | Age: 73
LOS: 1 days | End: 2023-09-05

## 2023-09-05 ENCOUNTER — APPOINTMENT (OUTPATIENT)
Dept: INTERNAL MEDICINE | Facility: CLINIC | Age: 73
End: 2023-09-05
Payer: COMMERCIAL

## 2023-09-05 VITALS
HEIGHT: 63 IN | SYSTOLIC BLOOD PRESSURE: 140 MMHG | DIASTOLIC BLOOD PRESSURE: 73 MMHG | WEIGHT: 140 LBS | BODY MASS INDEX: 24.8 KG/M2

## 2023-09-05 PROCEDURE — ZZZZZ: CPT

## 2023-09-05 NOTE — ADDENDUM
[FreeTextEntry1] : telephonic visit. Reviewed with resident Agree with residents evaluation and plan [email or web address]

## 2023-09-05 NOTE — REVIEW OF SYSTEMS
[Chest Pain] : chest pain [Fever] : no fever [Chills] : no chills [Vision Problems] : no vision problems [Palpitations] : no palpitations [Lower Ext Edema] : no lower extremity edema [Orthopnea] : no orthopnea [Shortness Of Breath] : no shortness of breath [Abdominal Pain] : no abdominal pain [Nausea] : no nausea [Vomiting] : no vomiting [Dysuria] : no dysuria [Skin Rash] : no skin rash [Dizziness] : no dizziness [FreeTextEntry5] : Left lower sided chest pain

## 2023-09-05 NOTE — PLAN
[FreeTextEntry1] : HTN  - c/w current regimen: carvedilol 12.5mg BID, entresto 97-103mg BID, spirinolactone 25mg QD  Next in office visit: flu, covid and RSV vaccine
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
## 2023-09-06 DIAGNOSIS — E11.9 TYPE 2 DIABETES MELLITUS WITHOUT COMPLICATIONS: ICD-10-CM

## 2023-09-06 DIAGNOSIS — I10 ESSENTIAL (PRIMARY) HYPERTENSION: ICD-10-CM

## 2023-09-06 DIAGNOSIS — J45.909 UNSPECIFIED ASTHMA, UNCOMPLICATED: ICD-10-CM

## 2023-09-06 DIAGNOSIS — I25.10 ATHEROSCLEROTIC HEART DISEASE OF NATIVE CORONARY ARTERY WITHOUT ANGINA PECTORIS: ICD-10-CM

## 2023-09-06 DIAGNOSIS — I50.9 HEART FAILURE, UNSPECIFIED: ICD-10-CM

## 2023-09-25 ENCOUNTER — APPOINTMENT (OUTPATIENT)
Dept: CV DIAGNOSITCS | Facility: HOSPITAL | Age: 73
End: 2023-09-25
Payer: COMMERCIAL

## 2023-09-25 PROCEDURE — 93306 TTE W/DOPPLER COMPLETE: CPT | Mod: 26

## 2023-09-27 ENCOUNTER — OUTPATIENT (OUTPATIENT)
Dept: OUTPATIENT SERVICES | Facility: HOSPITAL | Age: 73
LOS: 1 days | End: 2023-09-27
Payer: SELF-PAY

## 2023-09-27 ENCOUNTER — APPOINTMENT (OUTPATIENT)
Dept: CARDIOLOGY | Facility: HOSPITAL | Age: 73
End: 2023-09-27

## 2023-09-27 VITALS — HEART RATE: 60 BPM | DIASTOLIC BLOOD PRESSURE: 70 MMHG | SYSTOLIC BLOOD PRESSURE: 163 MMHG | OXYGEN SATURATION: 99 %

## 2023-09-27 DIAGNOSIS — I25.10 ATHEROSCLEROTIC HEART DISEASE OF NATIVE CORONARY ARTERY WITHOUT ANGINA PECTORIS: ICD-10-CM

## 2023-09-27 LAB — HEMOCCULT STL QL IA: NEGATIVE

## 2023-09-27 PROCEDURE — G0463: CPT

## 2023-09-27 PROCEDURE — 93005 ELECTROCARDIOGRAM TRACING: CPT

## 2023-09-28 DIAGNOSIS — I10 ESSENTIAL (PRIMARY) HYPERTENSION: ICD-10-CM

## 2023-09-28 DIAGNOSIS — E78.5 HYPERLIPIDEMIA, UNSPECIFIED: ICD-10-CM

## 2023-09-28 DIAGNOSIS — Z86.79 PERSONAL HISTORY OF OTHER DISEASES OF THE CIRCULATORY SYSTEM: ICD-10-CM

## 2023-09-28 DIAGNOSIS — I50.9 HEART FAILURE, UNSPECIFIED: ICD-10-CM

## 2023-10-03 ENCOUNTER — RESULT REVIEW (OUTPATIENT)
Age: 73
End: 2023-10-03

## 2023-10-03 ENCOUNTER — APPOINTMENT (OUTPATIENT)
Dept: OBGYN | Facility: HOSPITAL | Age: 73
End: 2023-10-03
Payer: COMMERCIAL

## 2023-10-03 ENCOUNTER — OUTPATIENT (OUTPATIENT)
Dept: OUTPATIENT SERVICES | Facility: HOSPITAL | Age: 73
LOS: 1 days | End: 2023-10-03
Payer: COMMERCIAL

## 2023-10-03 VITALS
HEART RATE: 52 BPM | BODY MASS INDEX: 24.63 KG/M2 | DIASTOLIC BLOOD PRESSURE: 80 MMHG | WEIGHT: 139 LBS | TEMPERATURE: 98 F | HEIGHT: 63 IN | SYSTOLIC BLOOD PRESSURE: 160 MMHG

## 2023-10-03 DIAGNOSIS — Z01.419 ENCOUNTER FOR GYNECOLOGICAL EXAMINATION (GENERAL) (ROUTINE) W/OUT ABNORMAL FINDINGS: ICD-10-CM

## 2023-10-03 LAB — HIV 1+2 AB+HIV1 P24 AG SERPL QL IA: SIGNIFICANT CHANGE UP

## 2023-10-03 PROCEDURE — 99213 OFFICE O/P EST LOW 20 MIN: CPT | Mod: GC

## 2023-10-03 PROCEDURE — 88141 CYTOPATH C/V INTERPRET: CPT

## 2023-10-04 DIAGNOSIS — Z01.419 ENCOUNTER FOR GYNECOLOGICAL EXAMINATION (GENERAL) (ROUTINE) WITHOUT ABNORMAL FINDINGS: ICD-10-CM

## 2023-10-04 LAB
HBV SURFACE AG SER-ACNC: SIGNIFICANT CHANGE UP
HCV AB S/CO SERPL IA: 0.08 S/CO — SIGNIFICANT CHANGE UP (ref 0–0.99)
HCV AB SERPL-IMP: SIGNIFICANT CHANGE UP
HPV HIGH+LOW RISK DNA PNL CVX: SIGNIFICANT CHANGE UP
T PALLIDUM AB TITR SER: NEGATIVE — SIGNIFICANT CHANGE UP

## 2023-10-10 LAB — CYTOLOGY SPEC DOC CYTO: SIGNIFICANT CHANGE UP

## 2023-10-31 ENCOUNTER — MED ADMIN CHARGE (OUTPATIENT)
Age: 73
End: 2023-10-31

## 2023-10-31 ENCOUNTER — APPOINTMENT (OUTPATIENT)
Dept: INTERNAL MEDICINE | Facility: CLINIC | Age: 73
End: 2023-10-31
Payer: COMMERCIAL

## 2023-10-31 ENCOUNTER — OUTPATIENT (OUTPATIENT)
Dept: OUTPATIENT SERVICES | Facility: HOSPITAL | Age: 73
LOS: 1 days | End: 2023-10-31

## 2023-10-31 VITALS
HEART RATE: 58 BPM | TEMPERATURE: 97.6 F | OXYGEN SATURATION: 98 % | SYSTOLIC BLOOD PRESSURE: 190 MMHG | HEIGHT: 63 IN | RESPIRATION RATE: 16 BRPM | WEIGHT: 140 LBS | DIASTOLIC BLOOD PRESSURE: 76 MMHG | BODY MASS INDEX: 24.8 KG/M2

## 2023-10-31 DIAGNOSIS — Z23 ENCOUNTER FOR IMMUNIZATION: ICD-10-CM

## 2023-10-31 PROCEDURE — 99213 OFFICE O/P EST LOW 20 MIN: CPT

## 2023-11-01 LAB
ALBUMIN SERPL ELPH-MCNC: 4.6 G/DL
ALP BLD-CCNC: 105 U/L
ALT SERPL-CCNC: 32 U/L
ANION GAP SERPL CALC-SCNC: 10 MMOL/L
AST SERPL-CCNC: 32 U/L
BASOPHILS # BLD AUTO: 0.03 K/UL
BASOPHILS NFR BLD AUTO: 0.8 %
BILIRUB SERPL-MCNC: 0.5 MG/DL
BUN SERPL-MCNC: 32 MG/DL
CALCIUM SERPL-MCNC: 10.6 MG/DL
CHLORIDE SERPL-SCNC: 107 MMOL/L
CO2 SERPL-SCNC: 24 MMOL/L
CREAT SERPL-MCNC: 0.69 MG/DL
EGFR: 92 ML/MIN/1.73M2
EOSINOPHIL # BLD AUTO: 0.11 K/UL
EOSINOPHIL NFR BLD AUTO: 2.8 %
GLUCOSE SERPL-MCNC: 91 MG/DL
HCT VFR BLD CALC: 33.2 %
HGB BLD-MCNC: 10.9 G/DL
IMM GRANULOCYTES NFR BLD AUTO: 0.3 %
LYMPHOCYTES # BLD AUTO: 1.71 K/UL
LYMPHOCYTES NFR BLD AUTO: 43.4 %
MAGNESIUM SERPL-MCNC: 1.9 MG/DL
MAN DIFF?: NORMAL
MCHC RBC-ENTMCNC: 31.5 PG
MCHC RBC-ENTMCNC: 32.8 GM/DL
MCV RBC AUTO: 96 FL
MONOCYTES # BLD AUTO: 0.62 K/UL
MONOCYTES NFR BLD AUTO: 15.7 %
NEUTROPHILS # BLD AUTO: 1.46 K/UL
NEUTROPHILS NFR BLD AUTO: 37 %
PLATELET # BLD AUTO: 204 K/UL
POTASSIUM SERPL-SCNC: 4.3 MMOL/L
PROT SERPL-MCNC: 7.8 G/DL
RBC # BLD: 3.46 M/UL
RBC # FLD: 15.4 %
SODIUM SERPL-SCNC: 141 MMOL/L
WBC # FLD AUTO: 3.94 K/UL

## 2023-11-03 DIAGNOSIS — E11.9 TYPE 2 DIABETES MELLITUS WITHOUT COMPLICATIONS: ICD-10-CM

## 2023-11-03 DIAGNOSIS — Z23 ENCOUNTER FOR IMMUNIZATION: ICD-10-CM

## 2023-11-03 DIAGNOSIS — J45.909 UNSPECIFIED ASTHMA, UNCOMPLICATED: ICD-10-CM

## 2023-11-03 DIAGNOSIS — I50.9 HEART FAILURE, UNSPECIFIED: ICD-10-CM

## 2023-11-03 DIAGNOSIS — I10 ESSENTIAL (PRIMARY) HYPERTENSION: ICD-10-CM

## 2023-12-05 ENCOUNTER — APPOINTMENT (OUTPATIENT)
Dept: INTERNAL MEDICINE | Facility: CLINIC | Age: 73
End: 2023-12-05

## 2024-01-03 ENCOUNTER — OUTPATIENT (OUTPATIENT)
Dept: OUTPATIENT SERVICES | Facility: HOSPITAL | Age: 74
LOS: 1 days | End: 2024-01-03
Payer: MEDICAID

## 2024-01-03 ENCOUNTER — APPOINTMENT (OUTPATIENT)
Dept: CARDIOLOGY | Facility: HOSPITAL | Age: 74
End: 2024-01-03

## 2024-01-03 VITALS
BODY MASS INDEX: 24.8 KG/M2 | HEART RATE: 68 BPM | OXYGEN SATURATION: 98 % | SYSTOLIC BLOOD PRESSURE: 130 MMHG | WEIGHT: 140 LBS | DIASTOLIC BLOOD PRESSURE: 71 MMHG

## 2024-01-03 DIAGNOSIS — I25.10 ATHEROSCLEROTIC HEART DISEASE OF NATIVE CORONARY ARTERY WITHOUT ANGINA PECTORIS: ICD-10-CM

## 2024-01-03 DIAGNOSIS — I25.10 ATHEROSCLEROTIC HEART DISEASE OF NATIVE CORONARY ARTERY W/OUT ANGINA PECTORIS: ICD-10-CM

## 2024-01-03 DIAGNOSIS — E78.5 HYPERLIPIDEMIA, UNSPECIFIED: ICD-10-CM

## 2024-01-03 DIAGNOSIS — Z86.79 PERSONAL HISTORY OF OTHER DISEASES OF THE CIRCULATORY SYSTEM: ICD-10-CM

## 2024-01-03 PROCEDURE — 93005 ELECTROCARDIOGRAM TRACING: CPT

## 2024-01-03 PROCEDURE — G0463: CPT

## 2024-01-05 ENCOUNTER — NON-APPOINTMENT (OUTPATIENT)
Age: 74
End: 2024-01-05

## 2024-01-05 DIAGNOSIS — I10 ESSENTIAL (PRIMARY) HYPERTENSION: ICD-10-CM

## 2024-01-05 DIAGNOSIS — E11.9 TYPE 2 DIABETES MELLITUS WITHOUT COMPLICATIONS: ICD-10-CM

## 2024-01-05 DIAGNOSIS — E78.5 HYPERLIPIDEMIA, UNSPECIFIED: ICD-10-CM

## 2024-01-05 PROBLEM — Z86.79 HISTORY OF ISCHEMIC CARDIOMYOPATHY: Status: ACTIVE | Noted: 2023-06-21

## 2024-01-05 PROBLEM — I25.10 CORONARY ARTERY DISEASE, UNSPECIFIED VESSEL OR LESION TYPE, UNSPECIFIED WHETHER ANGINA PRESENT, UNSPECIFIED WHETHER NATIVE OR TRANSPLANTED HEART: Status: ACTIVE | Noted: 2023-06-13

## 2024-01-05 NOTE — REASON FOR VISIT
No [FreeTextEntry1] : 72 year old female h/o HTN (used to be on diltiazem and nifedipine, now offl), asthma (no intubations), cervical cancer s/p radiation (many years ago), HFrEF, CAD s/p PCI to DCX (80%) x2 and OM1 (90%)x2 6/2023, presents for follow-up. Started on Hydralazine 25mg Q8H. As per family patient has been following a more strict diet. Was diagnosed with frozen shoulder and was instructed for physical therapy. Denies CP/SOB, orthopnea, PND, LE edema.

## 2024-01-05 NOTE — PHYSICAL EXAM
[Well Developed] : well developed [Normal Venous Pressure] : normal venous pressure [Normal S1, S2] : normal S1, S2 [No Murmur] : no murmur [Clear Lung Fields] : clear lung fields [Good Air Entry] : good air entry [Soft] : abdomen soft [Normal Gait] : normal gait [No Edema] : no edema [Moves all extremities] : moves all extremities [Alert and Oriented] : alert and oriented

## 2024-01-05 NOTE — CARDIOLOGY SUMMARY
[de-identified] :  Echo: TTE 6/2023:  CONCLUSIONS:     1. Left ventricular endocardium is not well visualized; however, the left ventricular systolic function appears moderately to severely reduced. (LVIDd 5.2 cm)   2. Mildly dilated left ventricular cavity size. There is global left ventricular hypokinesis.   3. The right ventricle is not well visualized.   4. Fibrocalcific aortic valve sclerosis without stenosis. [de-identified] : TTE 9/23  1. Left ventricular systolic function is moderately decreased with an ejection fraction of 36 % by Arias's method of disks. There is global left ventricular hypokinesis.  2. There is mild (grade 1) left ventricular diastolic dysfunction.  3. There is increased LV mass and eccentric hypertrophy.  4. Right ventricular cavity is normal in size and normal systolic function.  5. The aortic valve is tricuspid with normal structure without stenosis.  6. Structurally normal mitral valve with normal leaflet excursion. There is calcification of the mitral valve annulus. There is trace mitral regurgitation.  7. Trace pericardial effusion.    6/2023:  PCI to OM1 x 2 and LCx x 2

## 2024-01-05 NOTE — ASSESSMENT
[FreeTextEntry1] : 72 year old female h/o HTN (used to be on diltiazem and nifedipine, now offl), asthma (no intubations), cervical cancer s/p radiation (many years ago), HFrEF, CAD s/p PCI to DCX (80%) x2 and OM1 (90%)x2 6/2023, presents for follow-up.  1. Ischemic Cardiomyopathy LVEF 38% NYHA Class I - Diuretics: none - GDMT: Entresto 97/103mg Q12H, Coreg 12.5mg Q12H Spironolactone 25mg daily and Jardiance - Continue Hydralazine 25mg Q8H - TTE 9/23 LVEF 36% GDD1 increased LV mass and hypertrophy - Counselled on DASH diet avoiding salty foods - Monitor weight fluctuations    2. Coronary Artery Disease s/p PCI to OM1 and LCx - Continue ASA 81mg daily and Plavix 75mg daily for 1 year - Continue Atorvastatin 80mg daily    3. HTN  -  Continue Entresto 97/103mg Q12H, Coreg 12.5mg Q12H, Spironolactone 25mg daily - Continue Hydralazine 25mg Q8H - Goal SBP < 130mmHg   4. HLD - Continue Atorvastatin 80mg daily    5. T2DM - A1C 6.9% - On Jardiance   RTC 6 months to 1 year  Patricia Kennedy MD Cardiology Fellow

## 2024-01-05 NOTE — REVIEW OF SYSTEMS
[Fever] : no fever [Chills] : no chills [SOB] : no shortness of breath [Dyspnea on exertion] : not dyspnea during exertion [Chest Discomfort] : no chest discomfort [Lower Ext Edema] : no extremity edema [Palpitations] : no palpitations [Orthopnea] : no orthopnea [PND] : no PND [Cough] : no cough [Abdominal Pain] : no abdominal pain [Dizziness] : no dizziness

## 2024-02-16 ENCOUNTER — OUTPATIENT (OUTPATIENT)
Dept: OUTPATIENT SERVICES | Facility: HOSPITAL | Age: 74
LOS: 1 days | End: 2024-02-16

## 2024-02-16 ENCOUNTER — APPOINTMENT (OUTPATIENT)
Dept: INTERNAL MEDICINE | Facility: CLINIC | Age: 74
End: 2024-02-16
Payer: COMMERCIAL

## 2024-02-16 VITALS
RESPIRATION RATE: 16 BRPM | OXYGEN SATURATION: 95 % | HEIGHT: 63 IN | BODY MASS INDEX: 24.8 KG/M2 | DIASTOLIC BLOOD PRESSURE: 78 MMHG | SYSTOLIC BLOOD PRESSURE: 158 MMHG | HEART RATE: 69 BPM | WEIGHT: 140 LBS

## 2024-02-16 DIAGNOSIS — I50.9 HEART FAILURE, UNSPECIFIED: ICD-10-CM

## 2024-02-16 DIAGNOSIS — E11.9 TYPE 2 DIABETES MELLITUS WITHOUT COMPLICATIONS: ICD-10-CM

## 2024-02-16 DIAGNOSIS — M81.0 AGE-RELATED OSTEOPOROSIS WITHOUT CURRENT PATHOLOGICAL FRACTURE: ICD-10-CM

## 2024-02-16 DIAGNOSIS — J45.909 UNSPECIFIED ASTHMA, UNCOMPLICATED: ICD-10-CM

## 2024-02-16 DIAGNOSIS — I10 ESSENTIAL (PRIMARY) HYPERTENSION: ICD-10-CM

## 2024-02-16 PROCEDURE — G2211 COMPLEX E/M VISIT ADD ON: CPT | Mod: NC

## 2024-02-16 PROCEDURE — 99213 OFFICE O/P EST LOW 20 MIN: CPT | Mod: GE

## 2024-02-16 RX ORDER — SACUBITRIL AND VALSARTAN 97; 103 MG/1; MG/1
97-103 TABLET, FILM COATED ORAL TWICE DAILY
Qty: 60 | Refills: 0 | Status: DISCONTINUED | COMMUNITY
Start: 2023-06-13 | End: 2024-02-16

## 2024-02-16 NOTE — HISTORY OF PRESENT ILLNESS
[FreeTextEntry1] : Follow Up [de-identified] : 73 year old female h/o HTN, asthma (no intubations), cervical cancer s/p radiation (many years ago), HFrEF, CAD s/p PCI to DCX (80%) x2 and OM1 (90%)x2 6/2023, here for follow up.   Patient reports her weight has been stable since her last visit. Patient denies sob with walking 5-6 blocks and 12 stairs or ambulation.  Patient reports she needs to reapply for Jardiance and requesting for refills, has not taken Jardiance since January given insurance and paperwork issues

## 2024-02-16 NOTE — PLAN
[FreeTextEntry1] : 73 year old female h/o HTN, asthma (no intubations), cervical cancer s/p radiation in [place] (many years ago) follows Gyn, HFrEF, CAD s/p PCI to DCX (80%) x2 and OM1 (90%)x2 6/2023, here for follow up.   #HFrEF Likely Ischemic in origin: CAD s/p PCI to DCX (80%) x2 and OM1 (90%)x2 6/2023 Echo in 6/23: EF 38%, moderate-severe global LVSDF, Echo 9/23: EF 36%. Global LVSDF. Eccentric hypertrophy. MAC Educated on monitoring weights and informing PCP of any symptoms Currently off diuretics - GDMT with Coreg---> increased to 25 BID given HTN, Aldactone 25mg (lower dose due to hyperkalemia in-hospital), Entresto  BID - SGLT2i Jardiance hasn't taken since January given insurance and paperwork issues. Tasked [facility] for help and IM resident team next week for follow up - still hypertensive in office. Goal SBP<130 per cardiology. Also on Hydral 25 TID - following cardiology - BMP, CBC. A1C - f/u with Umar regarding Jardiance  #Anemia?  - previous CBC showing decreased hemoglobin, but priors were wnl - repeat CBC to see if true anemia - if anemia confirmed, consider further workup with iron studies, folate, B12 etc   #Diabetes - A1c 6.9 previously, repeat A1c today, this will reflect only a few weeks off Jardiance - f/u with ar and repeat A1c  #Osteoporosis - continue current rx - takes Vit D and Calcium supplements - Alendronate 70mg PO weekly  #CAD CAD s/p PCI to DCX (80%) x2 and OM1 (90%)x2 6/2023 Currently on aspirin, plavix, statin  #HTN - not optimally controlled - increased coreg to 25 BID, c/w other GDMT - instructed to keep home BP log  #HLD - HLD, lipitor-->80 mg   #Cervical Cancer - reports hx of cervical cancer s/p radiation many years ago in [place] - gyn referral for pap testing and further workup - Pap, HPV, HIV, Syphillic, HCV negative in 2023 - following Gyn  #Asthma  - Asthma well controlled - albuterol PRN  #HCM UTD on COVID, going to get booster and RSV at pharmacy  - Prevnar given 10/31 - Flu given 10/31/2023 iFOBT 9/23 negative - mammogram 7/2023 BiRads 1, pap smear UTD  CDW Dr. Berumen RTC in 3 months

## 2024-02-16 NOTE — REVIEW OF SYSTEMS
[Cough] : cough [Fever] : no fever [Chills] : no chills [Chest Pain] : no chest pain [Lower Ext Edema] : no lower extremity edema [Shortness Of Breath] : no shortness of breath [Abdominal Pain] : no abdominal pain [Vomiting] : no vomiting [Dysuria] : no dysuria [Hematuria] : no hematuria

## 2024-02-22 LAB
ANION GAP SERPL CALC-SCNC: 11 MMOL/L
BASOPHILS # BLD AUTO: 0.04 K/UL
BASOPHILS NFR BLD AUTO: 0.9 %
BUN SERPL-MCNC: 34 MG/DL
CALCIUM SERPL-MCNC: 10.4 MG/DL
CHLORIDE SERPL-SCNC: 105 MMOL/L
CO2 SERPL-SCNC: 23 MMOL/L
CREAT SERPL-MCNC: 0.69 MG/DL
EGFR: 92 ML/MIN/1.73M2
EOSINOPHIL # BLD AUTO: 0.1 K/UL
EOSINOPHIL NFR BLD AUTO: 2.3 %
ESTIMATED AVERAGE GLUCOSE: 140 MG/DL
GLUCOSE SERPL-MCNC: 110 MG/DL
HBA1C MFR BLD HPLC: 6.5 %
HCT VFR BLD CALC: 33.5 %
HGB BLD-MCNC: 10.9 G/DL
IMM GRANULOCYTES NFR BLD AUTO: 0.2 %
LYMPHOCYTES # BLD AUTO: 1.81 K/UL
LYMPHOCYTES NFR BLD AUTO: 41.2 %
MAN DIFF?: NORMAL
MCHC RBC-ENTMCNC: 30.8 PG
MCHC RBC-ENTMCNC: 32.5 GM/DL
MCV RBC AUTO: 94.6 FL
MONOCYTES # BLD AUTO: 0.48 K/UL
MONOCYTES NFR BLD AUTO: 10.9 %
NEUTROPHILS # BLD AUTO: 1.95 K/UL
NEUTROPHILS NFR BLD AUTO: 44.5 %
PLATELET # BLD AUTO: 203 K/UL
POTASSIUM SERPL-SCNC: 4.5 MMOL/L
RBC # BLD: 3.54 M/UL
RBC # FLD: 14.7 %
SODIUM SERPL-SCNC: 138 MMOL/L
WBC # FLD AUTO: 4.39 K/UL

## 2024-03-12 NOTE — PROGRESS NOTE ADULT - ASSESSMENT
[facility]   OPERATIVE REPORT   ORTHOPAEDIC SURGERY   PROVIDER: DR. PORFIRIO CORONADO    PATIENT INFORMATION   Paul Fitzgerald 38 y.o. female 1986   MRN: [id number]   LOCATION: [facility]     DATE OF PROCEDURE: 3/12/2024     PREOPERATIVE DIAGNOSES:   Right  1. Shoulder rotator cuff tear, high-grade partial to full-thickness supraspinatus  2. Shoulder biceps tendinopathy    POSTOPERATIVE DIAGNOSES:   Right  1. Shoulder rotator cuff tear, low-grade undersurface supraspinatus and infraspinatus   2. Shoulder biceps tendinopathy  3. Shoulder subacromial bursitis   4. Shoulder chondromalacia, grade 2 central to posterior glenoid  5. Shoulder synovitis  6. Shoulder posterior labral fraying     OPERATION:   Right  1. Shoulder open subpectoral biceps tenodesis (CPT 78555)  2. Shoulder arthroscopic extensive debridement (CPT 69642)    3. Shoulder arthroscopic subacromial decompression    SURGEON: PORFIRIO Coronado MD     ASSISTANTS:   Pal Bansal DO - Fellow - First Assist  SMA Ravinder     First Assistant Duties: A first assistant was necessary for this procedure. Assistance was provided for surgical wound exposure, manipulation, and retractor placement and positioning. There was no qualified resident available for the procedure.    ANESTHESIA: General with interscalene block    ESTIMATED BLOOD LOSS:  30 cc    IMPLANTS:   Implant Name Type Inv. Item Serial No.  Lot No. LRB No. Used Action   SYS TENSIONTIGHT KL BUTTON [facility] - [id number]  SYS TENSIONTIGHT KL BUTTON IMP  [employer] [id number] 1 Implanted      SPECIMENS: None.    COMPLICATIONS: None.     INTRAOPERATIVE COUNTS: Correct.     PROPHYLACTIC IV ANTIBIOTICS: Given per [facility] Protocol.    INDICATIONS FOR PROCEDURE:   Paul Fitzgerald 38 y.o. female  has been seen and evaluated in the office for continued right shoulder pain and mechanical symptoms.  After a lengthy discussion and failed nonoperative management, the patient wished  to proceed with surgical intervention and was fully informed of risks and benefits.    DETAILS OF PROCEDURE:  After the correct operative site was marked by the operating surgeon, an interscalene block was administered by the anesthesia team.  The patient was then taken to the operating room and placed supine on the operating room table, where the patient underwent general anesthesia by the anesthesia team.  The patient was then rolled into the lateral decubitus position with the operative side up.  A well-padded axillary roll, beanbag and pillows were placed. All pressure points were carefully padded and checked. The upper extremities and both lower extremities were placed in comfortable positions and were also well-padded.      A verbal timeout was confirmed to identify the patient, operative site and planned operative procedure. It was also confirmed the patient had received preoperative IV antibiotic per protocol.     Examination under anesthesia demonstrated: Forward elevation 180 degrees, external rotation with arm to side 70 degrees.    The operative upper extremity was then prepped and draped in the usual sterile fashion.     The Spider arm positioner was implemented with balanced suspension and appropriate landmarks were noted on the skin.  A posterior followed by yves-superior portals were created and systematic examination of the joint revealed the following:      -Biceps low-grade tendinopathy with some abrasive changes and low-grade partial tearing over the distal intra-articular portion  -Diffuse synovitis from anterior to posterior with degenerative labral fraying posteriorly  -Thickened and inflamed capsular tissue over the anterior rotator interval extending distally through the MGHL  -A posterior push-pull maneuver with probing was performed demonstrating an intact subscapularis  -The undersurface of the superior cuff was torn over the supraspinatus and infraspinatus, low-grade   -No loose  bodies  -Mild superficial delamination of the glenoid articular cartilage, grade 2 over the central and posterior mid aspect, otherwise no glenohumeral focal chondral defects    A shaver was again brought in to clear the field of view and to debride torn labrum and undersurface cuff from anterior to posterior. Extensive synovitis was also removed. Cautery was used to resect the interval to the coracoid and to release capsule through the MGHL. All thickened capsular tissue was released adjacent to the glenoid labrum. Care was taken to protect the axillary nerve during this portion of the procedure.     The biceps was released with curved Perez scissors in his case given the patient's preoperative physical exam, imaging and intraoperative findings.  The biceps tendon was adjacent to the undersurface cuff tearing and there was some secondary abrasive wear.    The torn cuff undersurface was debrided carefully with a shaver from anterior at the supraspinatus moving posteriorly to the infraspinatus.  Again close examination demonstrated this to be a low-grade undersurface tear.  There was no evidence of greater tuberosity fracture.    Attention was then turned to the subacromial space where significant hypertrophic bursa was encountered. Through an anterolateral working portal, shaver and cautery devices were introduced to clear the subacromial space of bursa and adhesions. Bursal reflections to the deltoid fascia anteriorly and posteriorly were taken down to further expand this space. This created a nice room with a view. The undersurface of the acromion was exposed with cautery to delineate bony anatomy. Systematic examination of this space revealed the following:    -Subacromial spurring/downsloping with narrowing of the anterolateral subacromial interval  -Confluent subacromial bursitis  -Intact bursal surface rotator cuff for probing and dynamic exam    The arthroscopic shaver was introduced to remove all subacromial  bursal tissue.  Bursectomy was performed.  Again the bursal surface of the rotator cuff was intact and there was no evidence of greater tuberosity fracture.    Subacromial decompression was completed using posterior cutting block technique in the standard fashion with a 4.5 mm alvaro without difficulty.  The anterior osteophyte was flattened converting the acromion morphology from a type 2 to a type 1.  Confirmation of adequate resection was confirmed while viewing from the lateral portal and referencing from the posterior acromial undersurface.     The shoulder and subacromial space were then irrigated and fluid was extravasated using suction.     An axillary incision was made overlying the pectoralis inferior border, measuring 3-4 cm in length. The interval between the pectoralis major and medial conjoined and biceps short head was developed bluntly. Dissection was carried down to the humerus and the biceps long head tendon was retrieved from the wound. Tenosynovitis was noted around the tendon. Approximately 10 mm of tendon was whipstitched just proximal to the musculotendinous junction and the remainder of the tendon was then resected. A guidepin was placed, approximately 10 mm proximal to the inferior crossing border of the pectoralis, to create a unicortical hole. The knotless Tension Tight unicortical biceps button was then dunked into the hole, pulling the free sutures to advance the tendon to the hole for reattachment. Knotless fixation was secure with proper biceps tensioning noted. The wound was then thoroughly irrigated with normal saline.      All portals were reapproximated using 3-0 Nylon. The axillary wound was closed with 3-0 Vicryl and 3-0 Monocryl. Mastisol and steri-strips were placed over the axillary wound. Local anesthetic was injected as well. Xeroform and absorbant mepilex pads were placed to cover all incisions.  A polar care shoulder sleeve was secured followed by a sling with abduction  pillow. The patient was then repositioned supine, extubated and taken to the recovery room where the patient arrived in stable condition with the compartments of the arm and forearm soft and good perfusion in all digits.     POSTOPERATIVE PLAN OF CARE:  -Patient will be discharged home according to protocol.  -Physical Therapy: Follow the biceps tenodesis repair rehab protocol. PROM may start now.  Active range of motion of the elbow and shoulder may start in 1 week. No biceps resistive activity x 8 weeks. Sling immobilization for 3-4 weeks.  -DVT prophylaxis: ASA 81 mg twice a day x 2 weeks.    72 year old female h/o HTN (only med at home nifepidine and Albuterol), asthma (no intubations) presents with new CHFrEF i/s/o positive stress test. [facility] with obstructive CAD, plans for PCI.  72 year old female h/o HTN (only med at home nifepidine and Albuterol), asthma (no intubations) presents with new HFrEF in setting of positive stress test. [facility] with obstructive CAD, plans for PCI.

## 2024-03-25 ENCOUNTER — RX RENEWAL (OUTPATIENT)
Age: 74
End: 2024-03-25

## 2024-04-22 ENCOUNTER — OUTPATIENT (OUTPATIENT)
Dept: OUTPATIENT SERVICES | Facility: HOSPITAL | Age: 74
LOS: 1 days | End: 2024-04-22

## 2024-04-22 ENCOUNTER — APPOINTMENT (OUTPATIENT)
Dept: INTERNAL MEDICINE | Facility: CLINIC | Age: 74
End: 2024-04-22
Payer: MEDICAID

## 2024-04-22 VITALS
RESPIRATION RATE: 16 BRPM | DIASTOLIC BLOOD PRESSURE: 60 MMHG | BODY MASS INDEX: 24.98 KG/M2 | WEIGHT: 141 LBS | OXYGEN SATURATION: 97 % | HEART RATE: 57 BPM | SYSTOLIC BLOOD PRESSURE: 124 MMHG | HEIGHT: 63 IN

## 2024-04-22 DIAGNOSIS — I50.9 HEART FAILURE, UNSPECIFIED: ICD-10-CM

## 2024-04-22 DIAGNOSIS — I10 ESSENTIAL (PRIMARY) HYPERTENSION: ICD-10-CM

## 2024-04-22 DIAGNOSIS — J45.909 UNSPECIFIED ASTHMA, UNCOMPLICATED: ICD-10-CM

## 2024-04-22 DIAGNOSIS — M81.0 AGE-RELATED OSTEOPOROSIS W/OUT CURRENT PATHOLOGICAL FRACTURE: ICD-10-CM

## 2024-04-22 DIAGNOSIS — D64.9 ANEMIA, UNSPECIFIED: ICD-10-CM

## 2024-04-22 DIAGNOSIS — E11.9 TYPE 2 DIABETES MELLITUS W/OUT COMPLICATIONS: ICD-10-CM

## 2024-04-22 PROCEDURE — 99214 OFFICE O/P EST MOD 30 MIN: CPT | Mod: GC

## 2024-04-22 RX ORDER — CARVEDILOL 25 MG/1
25 TABLET, FILM COATED ORAL
Qty: 180 | Refills: 1 | Status: ACTIVE | COMMUNITY
Start: 2023-08-21 | End: 1900-01-01

## 2024-04-22 RX ORDER — EMPAGLIFLOZIN 10 MG/1
10 TABLET, FILM COATED ORAL DAILY
Qty: 90 | Refills: 3 | Status: ACTIVE | COMMUNITY
Start: 2023-08-01 | End: 1900-01-01

## 2024-04-22 RX ORDER — FLUTICASONE PROPIONATE AND SALMETEROL 100; 50 UG/1; UG/1
100-50 POWDER RESPIRATORY (INHALATION)
Qty: 1 | Refills: 2 | Status: DISCONTINUED | COMMUNITY
Start: 2023-06-16 | End: 2024-04-22

## 2024-04-22 RX ORDER — ATORVASTATIN CALCIUM 80 MG/1
80 TABLET, FILM COATED ORAL
Qty: 90 | Refills: 2 | Status: ACTIVE | COMMUNITY
Start: 2023-06-13 | End: 1900-01-01

## 2024-04-22 RX ORDER — SACUBITRIL AND VALSARTAN 97; 103 MG/1; MG/1
97-103 TABLET, FILM COATED ORAL TWICE DAILY
Qty: 180 | Refills: 3 | Status: ACTIVE | COMMUNITY
Start: 2023-06-13 | End: 1900-01-01

## 2024-04-22 RX ORDER — SPIRONOLACTONE 25 MG/1
25 TABLET ORAL
Qty: 90 | Refills: 1 | Status: ACTIVE | COMMUNITY
Start: 2023-06-13 | End: 1900-01-01

## 2024-04-22 RX ORDER — CARVEDILOL 12.5 MG/1
12.5 TABLET, FILM COATED ORAL TWICE DAILY
Qty: 60 | Refills: 3 | Status: DISCONTINUED | COMMUNITY
Start: 2023-06-13 | End: 2024-04-22

## 2024-04-22 RX ORDER — ALBUTEROL SULFATE 90 UG/1
108 (90 BASE) INHALANT RESPIRATORY (INHALATION) EVERY 6 HOURS
Qty: 2 | Refills: 4 | Status: DISCONTINUED | COMMUNITY
Start: 2023-06-13 | End: 2024-04-22

## 2024-04-22 RX ORDER — ALENDRONATE SODIUM 70 MG/1
70 TABLET ORAL
Qty: 4 | Refills: 2 | Status: ACTIVE | COMMUNITY
Start: 2023-10-31 | End: 1900-01-01

## 2024-04-22 RX ORDER — ASPIRIN ENTERIC COATED TABLETS 81 MG 81 MG/1
81 TABLET, DELAYED RELEASE ORAL DAILY
Qty: 30 | Refills: 2 | Status: ACTIVE | COMMUNITY
Start: 2023-06-13 | End: 1900-01-01

## 2024-04-22 RX ORDER — CLOPIDOGREL BISULFATE 75 MG/1
75 TABLET, FILM COATED ORAL DAILY
Qty: 1 | Refills: 2 | Status: ACTIVE | COMMUNITY
Start: 2023-06-13 | End: 1900-01-01

## 2024-04-22 RX ORDER — BUDESONIDE AND FORMOTEROL FUMARATE DIHYDRATE 160; 4.5 UG/1; UG/1
160-4.5 AEROSOL RESPIRATORY (INHALATION) TWICE DAILY
Qty: 5 | Refills: 0 | Status: ACTIVE | COMMUNITY
Start: 2024-04-22 | End: 1900-01-01

## 2024-04-22 NOTE — HISTORY OF PRESENT ILLNESS
[FreeTextEntry1] : Follow Up [de-identified] : 73 year old female h/o HTN, asthma (no intubations), cervical cancer s/p radiation (many years ago), HFrEF, CAD s/p PCI to DCX (80%) x2 and OM1 (90%)x2 6/2023, here for follow up.   She reports she was recently hospitilized at [facility] from 4/10/24-4/12/24 for an asthma exacerbation and completed a short 6 day course of steroids. Reports she had an ultrasound there showing stable heart function. Reports today her sob has improved. Denies fever, chills, chest pain, leg swelling, palpitations.

## 2024-04-22 NOTE — PLAN
[FreeTextEntry1] :  73 year old female h/o HTN, asthma (no intubations), cervical cancer s/p radiation in [place] (many years ago) follows Gyn, HFrEF, CAD s/p PCI to DCX (80%) x2 and OM1 (90%)x2 6/2023, here for follow up after recent admission for asthma exacerbation.   #Asthma exacerbation  - Hospitalization at [place] 4/2024 for asthma exacerbation, completed short course of steroids - pulmonary function tests ordered, referral to pulmonary provided - given insurance issues, was recently only on Albuterol. Recently got medical insurance - transition albuterol PRN--> Symbicort standing and PRN for sob/wheezing - pulm referral for further management  #HFrEF Likely Ischemic in origin: CAD s/p PCI to DCX (80%) x2 and OM1 (90%)x2 6/2023 Echo in 6/23: EF 38%, moderate-severe global LVSDF, Echo 9/23: EF 36%. Global LVSDF. Eccentric hypertrophy. MAC Educated on monitoring weights and informing PCP of any symptoms Currently off diuretics - GDMT with Coreg---> increased to 25 BID given HTN, Aldactone 25mg (lower dose due to hyperkalemia in-hospital), Entresto  BID - SGLT2i Jardiance currently taking  - Goal SBP<130 per cardiology. Also on Hydral 25 TID - following cardiology - repeat TTE echo  #Anemia - previous CBC showing decreased hemoglobin -  further workup with iron studies, folate, B12 etc  - GI referral for colonoscopy. Has previously been offered colonoscopy but declined. Now agreeable for scope given anemia  #Diabetes - A1c 6.9 previously - c/w Jardiance  #Osteoporosis - continue current rx - takes Vit D and Calcium supplements - Alendronate 70mg PO weekly - PT referral provided given recent fall prior to [place] hospitalization   #CAD CAD s/p PCI to DCX (80%) x2 and OM1 (90%)x2 6/2023 Currently on aspirin, plavix, statin  #HTN - better controlled at this visit - increased coreg to 25 BID, c/w other GDMT - instructed to keep home BP log  #HLD - HLD, lipitor-->80 mg   #Cervical Cancer - reports hx of cervical cancer s/p radiation many years ago in [place] - Pap, HPV, HIV, Syphillic, HCV negative in 2023 - following Gyn  #HCM UTD on COVID, going to get booster and RSV at pharmacy  - Prevnar given 10/31 - Flu given 10/31/2023 iFOBT 9/23 negative, colonoscopy referral provided  - mammogram 7/2023 BiRads 1, pap smear UTD  CDW Dr. HERI Corey  RTC in 2 months

## 2024-04-22 NOTE — PHYSICAL EXAM
[No Acute Distress] : no acute distress [Well Nourished] : well nourished [EOMI] : extraocular movements intact [Normal TMs] : both tympanic membranes were normal [Supple] : supple [No Respiratory Distress] : no respiratory distress  [Normal S1, S2] : normal S1 and S2 [Soft] : abdomen soft [Non-distended] : non-distended [No CVA Tenderness] : no CVA  tenderness [No Focal Deficits] : no focal deficits [Alert and Oriented x3] : oriented to person, place, and time [Normal Sclera/Conjunctiva] : normal sclera/conjunctiva [Normal Outer Ear/Nose] : the outer ears and nose were normal in appearance [Normal Rate] : normal rate  [Regular Rhythm] : with a regular rhythm [No Murmur] : no murmur heard [de-identified] : trace lower extremity edema

## 2024-04-23 DIAGNOSIS — E11.9 TYPE 2 DIABETES MELLITUS WITHOUT COMPLICATIONS: ICD-10-CM

## 2024-04-23 DIAGNOSIS — I10 ESSENTIAL (PRIMARY) HYPERTENSION: ICD-10-CM

## 2024-04-23 DIAGNOSIS — M81.0 AGE-RELATED OSTEOPOROSIS WITHOUT CURRENT PATHOLOGICAL FRACTURE: ICD-10-CM

## 2024-04-23 DIAGNOSIS — I50.9 HEART FAILURE, UNSPECIFIED: ICD-10-CM

## 2024-04-23 DIAGNOSIS — J45.909 UNSPECIFIED ASTHMA, UNCOMPLICATED: ICD-10-CM

## 2024-05-16 DIAGNOSIS — M62.81 MUSCLE WEAKNESS (GENERALIZED): ICD-10-CM

## 2024-05-31 ENCOUNTER — NON-APPOINTMENT (OUTPATIENT)
Age: 74
End: 2024-05-31

## 2024-05-31 LAB
FERRITIN SERPL-MCNC: 126 NG/ML
FOLATE SERPL-MCNC: >20 NG/ML
IRON SATN MFR SERPL: 32 %
IRON SERPL-MCNC: 78 UG/DL
TIBC SERPL-MCNC: 244 UG/DL
UIBC SERPL-MCNC: 166 UG/DL
VIT B12 SERPL-MCNC: 715 PG/ML

## 2024-06-04 RX ORDER — HYDRALAZINE HYDROCHLORIDE 25 MG/1
25 TABLET ORAL 3 TIMES DAILY
Qty: 90 | Refills: 0 | Status: ACTIVE | COMMUNITY
Start: 2023-10-31 | End: 1900-01-01

## 2024-06-19 ENCOUNTER — APPOINTMENT (OUTPATIENT)
Dept: PULMONOLOGY | Facility: CLINIC | Age: 74
End: 2024-06-19
Payer: MEDICAID

## 2024-06-19 VITALS
DIASTOLIC BLOOD PRESSURE: 78 MMHG | HEIGHT: 63 IN | OXYGEN SATURATION: 95 % | SYSTOLIC BLOOD PRESSURE: 154 MMHG | RESPIRATION RATE: 14 BRPM | HEART RATE: 63 BPM | BODY MASS INDEX: 25.23 KG/M2 | TEMPERATURE: 97.8 F | WEIGHT: 142.38 LBS

## 2024-06-19 PROCEDURE — 99203 OFFICE O/P NEW LOW 30 MIN: CPT

## 2024-06-19 NOTE — HISTORY OF PRESENT ILLNESS
[Never] : never [TextBox_4] : Patient is a 73y/o F h/o HTN (only med at home Diltiazem and Albuterol), asthma (no intubations) presented to [facility] with shortness of breath found to have HFrEF, transferred from [facility] for [facility]. Hospitalzed 6/2-6/6/24.  On arrival, patient HDS. Underwent LHC with multivessel obstructive CAD and TTE which showed global systolic dysfunction with EF of 38%. Patient on aspirin and continued with GDMT started at [facility] including entresto and coreg. Started on spironolactone (reduced to 12.5mg due to mild which normalized prior to discharge. Pt was loaded with plavix and underwent PCI on 6/5 with dCX stent x 2 (80%) and OM stent x 2 (90%). Continued with DAPT and GDMT.   Upon visit is feeling well since D/C. Currently denies cough, SOB, wheezing, fever, chills, chest tightness, hoarseness, change in voice, or rhinorrhea. Continue to use Flovent daily with good effect, has not used albuterol as she feels she does not need it.

## 2024-06-19 NOTE — ASSESSMENT
[FreeTextEntry1] : -Continue use of Flovent inhaler with Albuterol PRN -PFT ordered -Continue F/U with Cardiology, scheduled 7/10 -Advised to call office immediately with any change or worsening of symptoms.   attending Agree with above Pt will schedule PFT, f/u based on reulsts

## 2024-06-24 ENCOUNTER — APPOINTMENT (OUTPATIENT)
Dept: PULMONOLOGY | Facility: CLINIC | Age: 74
End: 2024-06-24

## 2024-06-24 VITALS
OXYGEN SATURATION: 97 % | BODY MASS INDEX: 2.48 KG/M2 | SYSTOLIC BLOOD PRESSURE: 143 MMHG | WEIGHT: 14 LBS | HEIGHT: 63 IN | HEART RATE: 63 BPM | DIASTOLIC BLOOD PRESSURE: 71 MMHG

## 2024-06-24 PROCEDURE — 94729 DIFFUSING CAPACITY: CPT

## 2024-06-24 PROCEDURE — 94060 EVALUATION OF WHEEZING: CPT

## 2024-06-24 PROCEDURE — 94726 PLETHYSMOGRAPHY LUNG VOLUMES: CPT

## 2024-07-10 ENCOUNTER — APPOINTMENT (OUTPATIENT)
Dept: CARDIOLOGY | Facility: HOSPITAL | Age: 74
End: 2024-07-10

## 2024-07-10 ENCOUNTER — NON-APPOINTMENT (OUTPATIENT)
Age: 74
End: 2024-07-10

## 2024-07-10 ENCOUNTER — OUTPATIENT (OUTPATIENT)
Dept: OUTPATIENT SERVICES | Facility: HOSPITAL | Age: 74
LOS: 1 days | End: 2024-07-10
Payer: MEDICAID

## 2024-07-10 VITALS
DIASTOLIC BLOOD PRESSURE: 78 MMHG | WEIGHT: 143 LBS | HEART RATE: 61 BPM | OXYGEN SATURATION: 97 % | SYSTOLIC BLOOD PRESSURE: 161 MMHG | HEIGHT: 63 IN | BODY MASS INDEX: 25.34 KG/M2

## 2024-07-10 DIAGNOSIS — E11.9 TYPE 2 DIABETES MELLITUS W/OUT COMPLICATIONS: ICD-10-CM

## 2024-07-10 DIAGNOSIS — I25.10 ATHEROSCLEROTIC HEART DISEASE OF NATIVE CORONARY ARTERY W/OUT ANGINA PECTORIS: ICD-10-CM

## 2024-07-10 DIAGNOSIS — E78.5 HYPERLIPIDEMIA, UNSPECIFIED: ICD-10-CM

## 2024-07-10 DIAGNOSIS — Z86.79 PERSONAL HISTORY OF OTHER DISEASES OF THE CIRCULATORY SYSTEM: ICD-10-CM

## 2024-07-10 DIAGNOSIS — I10 ESSENTIAL (PRIMARY) HYPERTENSION: ICD-10-CM

## 2024-07-10 DIAGNOSIS — I25.10 ATHEROSCLEROTIC HEART DISEASE OF NATIVE CORONARY ARTERY WITHOUT ANGINA PECTORIS: ICD-10-CM

## 2024-07-10 PROCEDURE — 93005 ELECTROCARDIOGRAM TRACING: CPT

## 2024-07-10 PROCEDURE — G0463: CPT

## 2024-07-11 DIAGNOSIS — I10 ESSENTIAL (PRIMARY) HYPERTENSION: ICD-10-CM

## 2024-07-11 DIAGNOSIS — Z86.79 PERSONAL HISTORY OF OTHER DISEASES OF THE CIRCULATORY SYSTEM: ICD-10-CM

## 2024-09-09 ENCOUNTER — APPOINTMENT (OUTPATIENT)
Dept: INTERNAL MEDICINE | Facility: CLINIC | Age: 74
End: 2024-09-09

## 2024-10-09 NOTE — PATIENT PROFILE ADULT - STATED REASON FOR ADMISSION
NCCN Distress Thermometer    Date Screening Completed: 10/8/24    Screening Declined:  [] Yes    Number that best describes how much distress you've experienced in the past week, including today?  0 [] - No distress 1 [x]      2 []      3 []      4 []       5 []       6 []      7 []      8 []      9 []       10 [] - Extreme distress    PROBLEM LIST  Have you had concerns about any of the items below in the past week, including today?      Physical Concerns Practical Concerns   [] Pain [] Taking care of myself    [] Sleep [] Taking care of others    [] Fatigue [] Work   [] Tobacco use  [] School   [] Substance use  [] Housing   [] Memory or concentration [] Finances   [] Sexual health [] Insurance   [] Changes in eating  [] Transportation   [] Loss or change of physical abilities  []     [] Having enough food   Emotional Concerns [] Access to medicine   [] Worry or anxiety [] Treatment decisions   [] Sadness or depression    [] Loss of interest or enjoyment  Spiritual or Mosque Concerns   [] Grief or loss  [] Sense of meaning or purpose   [] Fear [] Changes in devang or beliefs   [] Loneliness  [] Death, dying, or afterlife   [] Anger [] Conflict between beliefs and cancer treatments    [] Changes in appearance [] Relationship with the sacred   [] Feelings of worthlessness or being a burden [] Ritual or dietary needs        Social Concerns     [] Relationship with spouse or partner     [] Relationship with children    [] Relationship with family members     [] Relationship with friends or coworkers     [] Communication with health care team     [] Ability to have children     [] Prejudice or discrimination        Other Concerns:     Patient received resource information and education:  [] Yes  [x] No    
transfer for cardiac cath

## 2024-10-15 ENCOUNTER — MED ADMIN CHARGE (OUTPATIENT)
Age: 74
End: 2024-10-15

## 2024-10-15 ENCOUNTER — OUTPATIENT (OUTPATIENT)
Dept: OUTPATIENT SERVICES | Facility: HOSPITAL | Age: 74
LOS: 1 days | End: 2024-10-15

## 2024-10-15 ENCOUNTER — APPOINTMENT (OUTPATIENT)
Dept: INTERNAL MEDICINE | Facility: CLINIC | Age: 74
End: 2024-10-15
Payer: MEDICAID

## 2024-10-15 VITALS
WEIGHT: 142 LBS | DIASTOLIC BLOOD PRESSURE: 70 MMHG | HEART RATE: 65 BPM | BODY MASS INDEX: 25.16 KG/M2 | OXYGEN SATURATION: 96 % | HEIGHT: 63 IN | RESPIRATION RATE: 16 BRPM | SYSTOLIC BLOOD PRESSURE: 156 MMHG

## 2024-10-15 DIAGNOSIS — J45.909 UNSPECIFIED ASTHMA, UNCOMPLICATED: ICD-10-CM

## 2024-10-15 DIAGNOSIS — E11.9 TYPE 2 DIABETES MELLITUS W/OUT COMPLICATIONS: ICD-10-CM

## 2024-10-15 DIAGNOSIS — I50.9 HEART FAILURE, UNSPECIFIED: ICD-10-CM

## 2024-10-15 DIAGNOSIS — Z23 ENCOUNTER FOR IMMUNIZATION: ICD-10-CM

## 2024-10-15 PROCEDURE — 99214 OFFICE O/P EST MOD 30 MIN: CPT | Mod: GC

## 2024-10-16 RX ORDER — FLUTICASONE PROPIONATE AND SALMETEROL 50; 100 UG/1; UG/1
100-50 POWDER RESPIRATORY (INHALATION)
Qty: 1 | Refills: 2 | Status: ACTIVE | COMMUNITY
Start: 2024-10-16 | End: 1900-01-01

## 2024-10-18 DIAGNOSIS — I50.9 HEART FAILURE, UNSPECIFIED: ICD-10-CM

## 2024-10-18 DIAGNOSIS — E11.9 TYPE 2 DIABETES MELLITUS WITHOUT COMPLICATIONS: ICD-10-CM

## 2024-10-18 DIAGNOSIS — J45.909 UNSPECIFIED ASTHMA, UNCOMPLICATED: ICD-10-CM

## 2024-10-23 ENCOUNTER — APPOINTMENT (OUTPATIENT)
Dept: CARDIOLOGY | Facility: HOSPITAL | Age: 74
End: 2024-10-23

## 2024-10-23 ENCOUNTER — NON-APPOINTMENT (OUTPATIENT)
Age: 74
End: 2024-10-23

## 2024-10-23 VITALS
DIASTOLIC BLOOD PRESSURE: 74 MMHG | BODY MASS INDEX: 25.15 KG/M2 | HEART RATE: 62 BPM | SYSTOLIC BLOOD PRESSURE: 161 MMHG | WEIGHT: 142 LBS | OXYGEN SATURATION: 97 %

## 2024-10-23 DIAGNOSIS — I10 ESSENTIAL (PRIMARY) HYPERTENSION: ICD-10-CM

## 2024-10-23 DIAGNOSIS — I25.10 ATHEROSCLEROTIC HEART DISEASE OF NATIVE CORONARY ARTERY W/OUT ANGINA PECTORIS: ICD-10-CM

## 2024-10-23 DIAGNOSIS — Z86.79 PERSONAL HISTORY OF OTHER DISEASES OF THE CIRCULATORY SYSTEM: ICD-10-CM

## 2024-10-23 DIAGNOSIS — E78.5 HYPERLIPIDEMIA, UNSPECIFIED: ICD-10-CM

## 2024-10-25 LAB
ALBUMIN SERPL ELPH-MCNC: 4.4 G/DL
ALP BLD-CCNC: 63 U/L
ALT SERPL-CCNC: 25 U/L
ANION GAP SERPL CALC-SCNC: 12 MMOL/L
ANION GAP SERPL CALC-SCNC: 13 MMOL/L
AST SERPL-CCNC: 26 U/L
BASOPHILS # BLD AUTO: 0.04 K/UL
BASOPHILS NFR BLD AUTO: 1 %
BILIRUB SERPL-MCNC: 0.5 MG/DL
BUN SERPL-MCNC: 27 MG/DL
BUN SERPL-MCNC: 28 MG/DL
CALCIUM SERPL-MCNC: 11.1 MG/DL
CALCIUM SERPL-MCNC: 9.9 MG/DL
CHLORIDE SERPL-SCNC: 103 MMOL/L
CHLORIDE SERPL-SCNC: 109 MMOL/L
CK SERPL-CCNC: 295 U/L
CO2 SERPL-SCNC: 20 MMOL/L
CO2 SERPL-SCNC: 22 MMOL/L
CREAT SERPL-MCNC: 0.77 MG/DL
CREAT SERPL-MCNC: 0.8 MG/DL
EGFR: 77 ML/MIN/1.73M2
EGFR: 81 ML/MIN/1.73M2
EOSINOPHIL # BLD AUTO: 0.07 K/UL
EOSINOPHIL NFR BLD AUTO: 1.8 %
GLUCOSE SERPL-MCNC: 109 MG/DL
GLUCOSE SERPL-MCNC: 94 MG/DL
HCT VFR BLD CALC: 33.5 %
HGB BLD-MCNC: 10.5 G/DL
IMM GRANULOCYTES NFR BLD AUTO: 0.3 %
LYMPHOCYTES # BLD AUTO: 1.61 K/UL
LYMPHOCYTES NFR BLD AUTO: 41 %
MAN DIFF?: NORMAL
MCHC RBC-ENTMCNC: 30.6 PG
MCHC RBC-ENTMCNC: 31.3 GM/DL
MCV RBC AUTO: 97.7 FL
MONOCYTES # BLD AUTO: 0.39 K/UL
MONOCYTES NFR BLD AUTO: 9.9 %
NEUTROPHILS # BLD AUTO: 1.81 K/UL
NEUTROPHILS NFR BLD AUTO: 46 %
PLATELET # BLD AUTO: 219 K/UL
POTASSIUM SERPL-SCNC: 4.6 MMOL/L
POTASSIUM SERPL-SCNC: 5.3 MMOL/L
PROT SERPL-MCNC: 7.5 G/DL
RBC # BLD: 3.43 M/UL
RBC # FLD: 16.4 %
SODIUM SERPL-SCNC: 137 MMOL/L
SODIUM SERPL-SCNC: 141 MMOL/L
WBC # FLD AUTO: 3.93 K/UL

## 2024-12-04 ENCOUNTER — RX RENEWAL (OUTPATIENT)
Age: 74
End: 2024-12-04

## 2024-12-04 RX ORDER — ASPIRIN 81 MG/1
81 TABLET, COATED ORAL
Qty: 30 | Refills: 1 | Status: ACTIVE | COMMUNITY
Start: 2024-12-04 | End: 1900-01-01

## 2025-01-28 ENCOUNTER — APPOINTMENT (OUTPATIENT)
Dept: INTERNAL MEDICINE | Facility: CLINIC | Age: 75
End: 2025-01-28

## 2025-03-12 ENCOUNTER — RX RENEWAL (OUTPATIENT)
Age: 75
End: 2025-03-12

## 2025-04-23 ENCOUNTER — NON-APPOINTMENT (OUTPATIENT)
Age: 75
End: 2025-04-23

## 2025-04-23 ENCOUNTER — APPOINTMENT (OUTPATIENT)
Dept: CARDIOLOGY | Facility: HOSPITAL | Age: 75
End: 2025-04-23

## 2025-04-23 ENCOUNTER — OUTPATIENT (OUTPATIENT)
Dept: OUTPATIENT SERVICES | Facility: HOSPITAL | Age: 75
LOS: 1 days | End: 2025-04-23
Payer: MEDICAID

## 2025-04-23 VITALS — HEART RATE: 60 BPM | DIASTOLIC BLOOD PRESSURE: 82 MMHG | OXYGEN SATURATION: 95 % | SYSTOLIC BLOOD PRESSURE: 186 MMHG

## 2025-04-23 DIAGNOSIS — E78.5 HYPERLIPIDEMIA, UNSPECIFIED: ICD-10-CM

## 2025-04-23 DIAGNOSIS — I10 ESSENTIAL (PRIMARY) HYPERTENSION: ICD-10-CM

## 2025-04-23 DIAGNOSIS — I25.10 ATHEROSCLEROTIC HEART DISEASE OF NATIVE CORONARY ARTERY WITHOUT ANGINA PECTORIS: ICD-10-CM

## 2025-04-23 DIAGNOSIS — I25.10 ATHEROSCLEROTIC HEART DISEASE OF NATIVE CORONARY ARTERY W/OUT ANGINA PECTORIS: ICD-10-CM

## 2025-04-23 DIAGNOSIS — Z86.79 PERSONAL HISTORY OF OTHER DISEASES OF THE CIRCULATORY SYSTEM: ICD-10-CM

## 2025-04-23 LAB
ESTIMATED AVERAGE GLUCOSE: 151 MG/DL
HBA1C MFR BLD HPLC: 6.9 %
HCT VFR BLD CALC: 37.1 %
HGB BLD-MCNC: 11.8 G/DL
MCHC RBC-ENTMCNC: 30.3 PG
MCHC RBC-ENTMCNC: 31.8 G/DL
MCV RBC AUTO: 95.4 FL
PLATELET # BLD AUTO: 206 K/UL
RBC # BLD: 3.89 M/UL
RBC # FLD: 16 %
WBC # FLD AUTO: 4.74 K/UL

## 2025-04-23 PROCEDURE — G0463: CPT

## 2025-04-23 PROCEDURE — 93005 ELECTROCARDIOGRAM TRACING: CPT

## 2025-04-23 RX ORDER — ISOSORBIDE DINITRATE 10 MG/1
10 TABLET ORAL EVERY 8 HOURS
Qty: 90 | Refills: 5 | Status: ACTIVE | COMMUNITY
Start: 2025-04-23 | End: 1900-01-01

## 2025-04-24 LAB
ALBUMIN SERPL ELPH-MCNC: 4.4 G/DL
ALP BLD-CCNC: 76 U/L
ALT SERPL-CCNC: 20 U/L
ANION GAP SERPL CALC-SCNC: 13 MMOL/L
AST SERPL-CCNC: 28 U/L
BILIRUB SERPL-MCNC: 0.4 MG/DL
BUN SERPL-MCNC: 35 MG/DL
CALCIUM SERPL-MCNC: 10.5 MG/DL
CHLORIDE SERPL-SCNC: 106 MMOL/L
CHOLEST SERPL-MCNC: 151 MG/DL
CO2 SERPL-SCNC: 22 MMOL/L
CREAT SERPL-MCNC: 1.01 MG/DL
EGFRCR SERPLBLD CKD-EPI 2021: 58 ML/MIN/1.73M2
GLUCOSE SERPL-MCNC: 101 MG/DL
HDLC SERPL-MCNC: 66 MG/DL
LDLC SERPL-MCNC: 66 MG/DL
NONHDLC SERPL-MCNC: 85 MG/DL
POTASSIUM SERPL-SCNC: 4.9 MMOL/L
PROT SERPL-MCNC: 7.8 G/DL
SODIUM SERPL-SCNC: 141 MMOL/L
TRIGL SERPL-MCNC: 112 MG/DL

## 2025-04-25 DIAGNOSIS — I10 ESSENTIAL (PRIMARY) HYPERTENSION: ICD-10-CM

## 2025-04-25 DIAGNOSIS — E78.5 HYPERLIPIDEMIA, UNSPECIFIED: ICD-10-CM

## 2025-05-05 ENCOUNTER — NON-APPOINTMENT (OUTPATIENT)
Age: 75
End: 2025-05-05

## 2025-05-06 ENCOUNTER — APPOINTMENT (OUTPATIENT)
Dept: CARDIOLOGY | Facility: CLINIC | Age: 75
End: 2025-05-06
Payer: MEDICAID

## 2025-05-06 PROCEDURE — 93306 TTE W/DOPPLER COMPLETE: CPT

## 2025-05-07 ENCOUNTER — NON-APPOINTMENT (OUTPATIENT)
Age: 75
End: 2025-05-07

## 2025-05-07 ENCOUNTER — APPOINTMENT (OUTPATIENT)
Dept: CARDIOLOGY | Facility: HOSPITAL | Age: 75
End: 2025-05-07

## 2025-05-07 ENCOUNTER — OUTPATIENT (OUTPATIENT)
Dept: OUTPATIENT SERVICES | Facility: HOSPITAL | Age: 75
LOS: 1 days | End: 2025-05-07
Payer: MEDICAID

## 2025-05-07 VITALS — HEART RATE: 64 BPM | DIASTOLIC BLOOD PRESSURE: 61 MMHG | OXYGEN SATURATION: 96 % | SYSTOLIC BLOOD PRESSURE: 121 MMHG

## 2025-05-07 DIAGNOSIS — Z86.79 PERSONAL HISTORY OF OTHER DISEASES OF THE CIRCULATORY SYSTEM: ICD-10-CM

## 2025-05-07 DIAGNOSIS — I25.10 ATHEROSCLEROTIC HEART DISEASE OF NATIVE CORONARY ARTERY WITHOUT ANGINA PECTORIS: ICD-10-CM

## 2025-05-07 DIAGNOSIS — I25.10 ATHEROSCLEROTIC HEART DISEASE OF NATIVE CORONARY ARTERY W/OUT ANGINA PECTORIS: ICD-10-CM

## 2025-05-07 PROCEDURE — G0463: CPT

## 2025-05-07 PROCEDURE — 93005 ELECTROCARDIOGRAM TRACING: CPT

## 2025-05-13 ENCOUNTER — NON-APPOINTMENT (OUTPATIENT)
Age: 75
End: 2025-05-13

## 2025-05-13 ENCOUNTER — APPOINTMENT (OUTPATIENT)
Dept: INTERNAL MEDICINE | Facility: CLINIC | Age: 75
End: 2025-05-13
Payer: MEDICAID

## 2025-05-13 ENCOUNTER — OUTPATIENT (OUTPATIENT)
Dept: OUTPATIENT SERVICES | Facility: HOSPITAL | Age: 75
LOS: 1 days | End: 2025-05-13

## 2025-05-13 VITALS
BODY MASS INDEX: 25.34 KG/M2 | HEIGHT: 63 IN | WEIGHT: 143 LBS | DIASTOLIC BLOOD PRESSURE: 75 MMHG | SYSTOLIC BLOOD PRESSURE: 190 MMHG | OXYGEN SATURATION: 98 % | HEART RATE: 57 BPM

## 2025-05-13 VITALS — SYSTOLIC BLOOD PRESSURE: 186 MMHG | DIASTOLIC BLOOD PRESSURE: 66 MMHG

## 2025-05-13 DIAGNOSIS — I10 ESSENTIAL (PRIMARY) HYPERTENSION: ICD-10-CM

## 2025-05-13 DIAGNOSIS — E11.9 TYPE 2 DIABETES MELLITUS W/OUT COMPLICATIONS: ICD-10-CM

## 2025-05-13 DIAGNOSIS — J45.909 UNSPECIFIED ASTHMA, UNCOMPLICATED: ICD-10-CM

## 2025-05-13 DIAGNOSIS — I50.9 HEART FAILURE, UNSPECIFIED: ICD-10-CM

## 2025-05-13 DIAGNOSIS — M25.512 PAIN IN RIGHT SHOULDER: ICD-10-CM

## 2025-05-13 DIAGNOSIS — E78.5 HYPERLIPIDEMIA, UNSPECIFIED: ICD-10-CM

## 2025-05-13 DIAGNOSIS — M25.511 PAIN IN RIGHT SHOULDER: ICD-10-CM

## 2025-05-13 PROCEDURE — G2211 COMPLEX E/M VISIT ADD ON: CPT | Mod: NC

## 2025-05-13 PROCEDURE — 99214 OFFICE O/P EST MOD 30 MIN: CPT

## 2025-05-13 RX ORDER — LIDOCAINE 4% 40 MG/G
4 PATCH TRANSDERMAL DAILY
Qty: 90 | Refills: 0 | Status: ACTIVE | COMMUNITY
Start: 2025-05-13 | End: 1900-01-01

## 2025-05-14 VITALS — SYSTOLIC BLOOD PRESSURE: 178 MMHG | DIASTOLIC BLOOD PRESSURE: 81 MMHG

## 2025-05-14 DIAGNOSIS — E11.9 TYPE 2 DIABETES MELLITUS WITHOUT COMPLICATIONS: ICD-10-CM

## 2025-05-14 DIAGNOSIS — E78.5 HYPERLIPIDEMIA, UNSPECIFIED: ICD-10-CM

## 2025-05-14 DIAGNOSIS — I10 ESSENTIAL (PRIMARY) HYPERTENSION: ICD-10-CM

## 2025-05-14 DIAGNOSIS — M25.511 PAIN IN RIGHT SHOULDER: ICD-10-CM

## 2025-05-14 DIAGNOSIS — I50.9 HEART FAILURE, UNSPECIFIED: ICD-10-CM

## 2025-05-14 DIAGNOSIS — J45.909 UNSPECIFIED ASTHMA, UNCOMPLICATED: ICD-10-CM

## 2025-05-28 ENCOUNTER — APPOINTMENT (OUTPATIENT)
Dept: RADIOLOGY | Facility: CLINIC | Age: 75
End: 2025-05-28
Payer: MEDICAID

## 2025-05-28 ENCOUNTER — OUTPATIENT (OUTPATIENT)
Dept: OUTPATIENT SERVICES | Facility: HOSPITAL | Age: 75
LOS: 1 days | End: 2025-05-28
Payer: MEDICAID

## 2025-05-28 ENCOUNTER — APPOINTMENT (OUTPATIENT)
Dept: MAMMOGRAPHY | Facility: CLINIC | Age: 75
End: 2025-05-28
Payer: MEDICAID

## 2025-05-28 DIAGNOSIS — Z00.00 ENCOUNTER FOR GENERAL ADULT MEDICAL EXAMINATION WITHOUT ABNORMAL FINDINGS: ICD-10-CM

## 2025-05-28 PROCEDURE — 77067 SCR MAMMO BI INCL CAD: CPT

## 2025-05-28 PROCEDURE — 73030 X-RAY EXAM OF SHOULDER: CPT | Mod: 26,50

## 2025-05-28 PROCEDURE — 77063 BREAST TOMOSYNTHESIS BI: CPT | Mod: 26

## 2025-05-28 PROCEDURE — 77063 BREAST TOMOSYNTHESIS BI: CPT

## 2025-05-28 PROCEDURE — 73030 X-RAY EXAM OF SHOULDER: CPT

## 2025-05-28 PROCEDURE — 77067 SCR MAMMO BI INCL CAD: CPT | Mod: 26

## 2025-06-16 ENCOUNTER — OUTPATIENT (OUTPATIENT)
Dept: OUTPATIENT SERVICES | Facility: HOSPITAL | Age: 75
LOS: 1 days | End: 2025-06-16

## 2025-06-16 ENCOUNTER — APPOINTMENT (OUTPATIENT)
Dept: INTERNAL MEDICINE | Facility: CLINIC | Age: 75
End: 2025-06-16
Payer: MEDICAID

## 2025-06-16 VITALS
HEIGHT: 63 IN | HEART RATE: 64 BPM | SYSTOLIC BLOOD PRESSURE: 140 MMHG | RESPIRATION RATE: 16 BRPM | BODY MASS INDEX: 25.34 KG/M2 | OXYGEN SATURATION: 95 % | DIASTOLIC BLOOD PRESSURE: 68 MMHG | WEIGHT: 143 LBS

## 2025-06-16 PROBLEM — I50.9 CHRONIC CONGESTIVE HEART FAILURE, UNSPECIFIED HEART FAILURE TYPE: Status: ACTIVE | Noted: 2025-06-16

## 2025-06-16 PROCEDURE — 99214 OFFICE O/P EST MOD 30 MIN: CPT

## 2025-06-16 RX ORDER — ALBUTEROL SULFATE 90 UG/1
108 (90 BASE) INHALANT RESPIRATORY (INHALATION)
Qty: 30 | Refills: 6 | Status: ACTIVE | COMMUNITY
Start: 2025-06-16 | End: 1900-01-01

## 2025-06-20 DIAGNOSIS — M25.511 PAIN IN RIGHT SHOULDER: ICD-10-CM

## 2025-06-20 DIAGNOSIS — E78.5 HYPERLIPIDEMIA, UNSPECIFIED: ICD-10-CM

## 2025-06-20 DIAGNOSIS — I50.9 HEART FAILURE, UNSPECIFIED: ICD-10-CM

## 2025-06-20 DIAGNOSIS — J45.909 UNSPECIFIED ASTHMA, UNCOMPLICATED: ICD-10-CM

## 2025-06-20 DIAGNOSIS — E11.9 TYPE 2 DIABETES MELLITUS WITHOUT COMPLICATIONS: ICD-10-CM

## 2025-06-20 DIAGNOSIS — I10 ESSENTIAL (PRIMARY) HYPERTENSION: ICD-10-CM

## 2025-06-25 ENCOUNTER — APPOINTMENT (OUTPATIENT)
Dept: ORTHOPEDIC SURGERY | Facility: CLINIC | Age: 75
End: 2025-06-25
Payer: MEDICAID

## 2025-06-25 VITALS — HEIGHT: 63 IN | BODY MASS INDEX: 25.69 KG/M2 | WEIGHT: 145 LBS

## 2025-06-25 PROCEDURE — 72040 X-RAY EXAM NECK SPINE 2-3 VW: CPT

## 2025-06-25 PROCEDURE — 99203 OFFICE O/P NEW LOW 30 MIN: CPT

## 2025-07-18 ENCOUNTER — APPOINTMENT (OUTPATIENT)
Dept: MRI IMAGING | Facility: CLINIC | Age: 75
End: 2025-07-18

## 2025-07-18 PROCEDURE — 73221 MRI JOINT UPR EXTREM W/O DYE: CPT | Mod: 26,LT

## 2025-07-18 PROCEDURE — 73221 MRI JOINT UPR EXTREM W/O DYE: CPT | Mod: 26,RT,76

## 2025-07-18 PROCEDURE — 77063 BREAST TOMOSYNTHESIS BI: CPT

## 2025-07-18 PROCEDURE — 73030 X-RAY EXAM OF SHOULDER: CPT

## 2025-07-18 PROCEDURE — 73221 MRI JOINT UPR EXTREM W/O DYE: CPT

## 2025-07-18 PROCEDURE — 77067 SCR MAMMO BI INCL CAD: CPT

## 2025-07-26 ENCOUNTER — NON-APPOINTMENT (OUTPATIENT)
Age: 75
End: 2025-07-26

## 2025-09-05 ENCOUNTER — APPOINTMENT (OUTPATIENT)
Dept: NEUROLOGY | Facility: CLINIC | Age: 75
End: 2025-09-05